# Patient Record
Sex: FEMALE | Race: WHITE | NOT HISPANIC OR LATINO | Employment: FULL TIME | URBAN - NONMETROPOLITAN AREA
[De-identification: names, ages, dates, MRNs, and addresses within clinical notes are randomized per-mention and may not be internally consistent; named-entity substitution may affect disease eponyms.]

---
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
## 2023-09-09 ENCOUNTER — ANESTHESIA (OUTPATIENT)
Dept: SURGERY | Facility: MEDICAL CENTER | Age: 23
DRG: 661 | End: 2023-09-09
Payer: COMMERCIAL

## 2023-09-09 ENCOUNTER — ANESTHESIA EVENT (OUTPATIENT)
Dept: SURGERY | Facility: MEDICAL CENTER | Age: 23
DRG: 661 | End: 2023-09-09
Payer: COMMERCIAL

## 2023-09-09 ENCOUNTER — APPOINTMENT (OUTPATIENT)
Dept: RADIOLOGY | Facility: MEDICAL CENTER | Age: 23
DRG: 661 | End: 2023-09-09
Attending: UROLOGY
Payer: COMMERCIAL

## 2023-09-09 ENCOUNTER — HOSPITAL ENCOUNTER (INPATIENT)
Facility: MEDICAL CENTER | Age: 23
LOS: 1 days | DRG: 661 | End: 2023-09-11
Attending: EMERGENCY MEDICINE | Admitting: INTERNAL MEDICINE
Payer: COMMERCIAL

## 2023-09-09 DIAGNOSIS — N20.1 URETEROLITHIASIS: ICD-10-CM

## 2023-09-09 DIAGNOSIS — N20.0 NEPHROLITHIASIS: ICD-10-CM

## 2023-09-09 PROBLEM — F41.9 ANXIETY: Status: ACTIVE | Noted: 2023-09-09

## 2023-09-09 PROBLEM — N39.0 UTI (URINARY TRACT INFECTION): Status: ACTIVE | Noted: 2023-09-09

## 2023-09-09 PROBLEM — F31.9 BIPOLAR DISORDER (HCC): Status: ACTIVE | Noted: 2023-09-09

## 2023-09-09 LAB
ALBUMIN SERPL BCP-MCNC: 4.6 G/DL (ref 3.2–4.9)
ALBUMIN/GLOB SERPL: 1.7 G/DL
ALP SERPL-CCNC: 59 U/L (ref 30–99)
ALT SERPL-CCNC: 14 U/L (ref 2–50)
ANION GAP SERPL CALC-SCNC: 12 MMOL/L (ref 7–16)
APPEARANCE UR: CLEAR
AST SERPL-CCNC: 17 U/L (ref 12–45)
BACTERIA #/AREA URNS HPF: NEGATIVE /HPF
BASOPHILS # BLD AUTO: 0.5 % (ref 0–1.8)
BASOPHILS # BLD: 0.07 K/UL (ref 0–0.12)
BILIRUB SERPL-MCNC: 0.5 MG/DL (ref 0.1–1.5)
BILIRUB UR QL STRIP.AUTO: NEGATIVE
BUN SERPL-MCNC: 13 MG/DL (ref 8–22)
CALCIUM ALBUM COR SERPL-MCNC: 9.1 MG/DL (ref 8.5–10.5)
CALCIUM SERPL-MCNC: 9.6 MG/DL (ref 8.5–10.5)
CHLORIDE SERPL-SCNC: 105 MMOL/L (ref 96–112)
CO2 SERPL-SCNC: 22 MMOL/L (ref 20–33)
COLOR UR: YELLOW
CREAT SERPL-MCNC: 0.7 MG/DL (ref 0.5–1.4)
EOSINOPHIL # BLD AUTO: 0.46 K/UL (ref 0–0.51)
EOSINOPHIL NFR BLD: 3.6 % (ref 0–6.9)
EPI CELLS #/AREA URNS HPF: ABNORMAL /HPF
ERYTHROCYTE [DISTWIDTH] IN BLOOD BY AUTOMATED COUNT: 43.1 FL (ref 35.9–50)
GFR SERPLBLD CREATININE-BSD FMLA CKD-EPI: 125 ML/MIN/1.73 M 2
GLOBULIN SER CALC-MCNC: 2.7 G/DL (ref 1.9–3.5)
GLUCOSE SERPL-MCNC: 102 MG/DL (ref 65–99)
GLUCOSE UR STRIP.AUTO-MCNC: NEGATIVE MG/DL
HCG SERPL QL: NEGATIVE
HCT VFR BLD AUTO: 40.9 % (ref 37–47)
HGB BLD-MCNC: 14.2 G/DL (ref 12–16)
HYALINE CASTS #/AREA URNS LPF: ABNORMAL /LPF
IMM GRANULOCYTES # BLD AUTO: 0.06 K/UL (ref 0–0.11)
IMM GRANULOCYTES NFR BLD AUTO: 0.5 % (ref 0–0.9)
KETONES UR STRIP.AUTO-MCNC: NEGATIVE MG/DL
LACTATE SERPL-SCNC: 1.2 MMOL/L (ref 0.5–2)
LEUKOCYTE ESTERASE UR QL STRIP.AUTO: ABNORMAL
LIPASE SERPL-CCNC: 22 U/L (ref 11–82)
LYMPHOCYTES # BLD AUTO: 2.67 K/UL (ref 1–4.8)
LYMPHOCYTES NFR BLD: 20.7 % (ref 22–41)
MCH RBC QN AUTO: 32.9 PG (ref 27–33)
MCHC RBC AUTO-ENTMCNC: 34.7 G/DL (ref 32.2–35.5)
MCV RBC AUTO: 94.7 FL (ref 81.4–97.8)
MICRO URNS: ABNORMAL
MONOCYTES # BLD AUTO: 0.88 K/UL (ref 0–0.85)
MONOCYTES NFR BLD AUTO: 6.8 % (ref 0–13.4)
NEUTROPHILS # BLD AUTO: 8.78 K/UL (ref 1.82–7.42)
NEUTROPHILS NFR BLD: 67.9 % (ref 44–72)
NITRITE UR QL STRIP.AUTO: NEGATIVE
NRBC # BLD AUTO: 0 K/UL
NRBC BLD-RTO: 0 /100 WBC (ref 0–0.2)
PH UR STRIP.AUTO: 7.5 [PH] (ref 5–8)
PLATELET # BLD AUTO: 313 K/UL (ref 164–446)
PMV BLD AUTO: 10.9 FL (ref 9–12.9)
POTASSIUM SERPL-SCNC: 4.2 MMOL/L (ref 3.6–5.5)
PROT SERPL-MCNC: 7.3 G/DL (ref 6–8.2)
PROT UR QL STRIP: 30 MG/DL
RBC # BLD AUTO: 4.32 M/UL (ref 4.2–5.4)
RBC # URNS HPF: ABNORMAL /HPF
RBC UR QL AUTO: ABNORMAL
SODIUM SERPL-SCNC: 139 MMOL/L (ref 135–145)
SP GR UR STRIP.AUTO: 1.02
UROBILINOGEN UR STRIP.AUTO-MCNC: 0.2 MG/DL
WBC # BLD AUTO: 12.9 K/UL (ref 4.8–10.8)
WBC #/AREA URNS HPF: ABNORMAL /HPF

## 2023-09-09 PROCEDURE — 160028 HCHG SURGERY MINUTES - 1ST 30 MINS LEVEL 3: Performed by: UROLOGY

## 2023-09-09 PROCEDURE — 81001 URINALYSIS AUTO W/SCOPE: CPT

## 2023-09-09 PROCEDURE — 87086 URINE CULTURE/COLONY COUNT: CPT

## 2023-09-09 PROCEDURE — 36415 COLL VENOUS BLD VENIPUNCTURE: CPT

## 2023-09-09 PROCEDURE — 160036 HCHG PACU - EA ADDL 30 MINS PHASE I: Performed by: UROLOGY

## 2023-09-09 PROCEDURE — C2617 STENT, NON-COR, TEM W/O DEL: HCPCS | Performed by: UROLOGY

## 2023-09-09 PROCEDURE — C1894 INTRO/SHEATH, NON-LASER: HCPCS | Performed by: UROLOGY

## 2023-09-09 PROCEDURE — G0378 HOSPITAL OBSERVATION PER HR: HCPCS

## 2023-09-09 PROCEDURE — 99291 CRITICAL CARE FIRST HOUR: CPT

## 2023-09-09 PROCEDURE — 96374 THER/PROPH/DIAG INJ IV PUSH: CPT

## 2023-09-09 PROCEDURE — 700105 HCHG RX REV CODE 258: Mod: UD

## 2023-09-09 PROCEDURE — 160002 HCHG RECOVERY MINUTES (STAT): Performed by: UROLOGY

## 2023-09-09 PROCEDURE — A9270 NON-COVERED ITEM OR SERVICE: HCPCS | Mod: UD | Performed by: ANESTHESIOLOGY

## 2023-09-09 PROCEDURE — C1747 HCHG SHELL REV 278 C1747: HCPCS | Performed by: UROLOGY

## 2023-09-09 PROCEDURE — A9270 NON-COVERED ITEM OR SERVICE: HCPCS | Mod: UD

## 2023-09-09 PROCEDURE — 700101 HCHG RX REV CODE 250: Mod: UD | Performed by: ANESTHESIOLOGY

## 2023-09-09 PROCEDURE — 160009 HCHG ANES TIME/MIN: Performed by: UROLOGY

## 2023-09-09 PROCEDURE — 84703 CHORIONIC GONADOTROPIN ASSAY: CPT

## 2023-09-09 PROCEDURE — 700102 HCHG RX REV CODE 250 W/ 637 OVERRIDE(OP): Mod: UD

## 2023-09-09 PROCEDURE — 99222 1ST HOSP IP/OBS MODERATE 55: CPT | Mod: GC | Performed by: INTERNAL MEDICINE

## 2023-09-09 PROCEDURE — 700105 HCHG RX REV CODE 258: Mod: UD | Performed by: ANESTHESIOLOGY

## 2023-09-09 PROCEDURE — 96375 TX/PRO/DX INJ NEW DRUG ADDON: CPT | Mod: XU

## 2023-09-09 PROCEDURE — 160035 HCHG PACU - 1ST 60 MINS PHASE I: Performed by: UROLOGY

## 2023-09-09 PROCEDURE — 83605 ASSAY OF LACTIC ACID: CPT

## 2023-09-09 PROCEDURE — 0T768DZ DILATION OF RIGHT URETER WITH INTRALUMINAL DEVICE, VIA NATURAL OR ARTIFICIAL OPENING ENDOSCOPIC: ICD-10-PCS | Performed by: UROLOGY

## 2023-09-09 PROCEDURE — 83690 ASSAY OF LIPASE: CPT

## 2023-09-09 PROCEDURE — 700102 HCHG RX REV CODE 250 W/ 637 OVERRIDE(OP): Mod: UD | Performed by: ANESTHESIOLOGY

## 2023-09-09 PROCEDURE — 700111 HCHG RX REV CODE 636 W/ 250 OVERRIDE (IP): Mod: JZ,UD

## 2023-09-09 PROCEDURE — 80053 COMPREHEN METABOLIC PANEL: CPT

## 2023-09-09 PROCEDURE — 700111 HCHG RX REV CODE 636 W/ 250 OVERRIDE (IP): Mod: UD | Performed by: ANESTHESIOLOGY

## 2023-09-09 PROCEDURE — 700105 HCHG RX REV CODE 258: Performed by: EMERGENCY MEDICINE

## 2023-09-09 PROCEDURE — 0TF38ZZ FRAGMENTATION IN RIGHT KIDNEY PELVIS, VIA NATURAL OR ARTIFICIAL OPENING ENDOSCOPIC: ICD-10-PCS | Performed by: UROLOGY

## 2023-09-09 PROCEDURE — 0TF68ZZ FRAGMENTATION IN RIGHT URETER, VIA NATURAL OR ARTIFICIAL OPENING ENDOSCOPIC: ICD-10-PCS | Performed by: UROLOGY

## 2023-09-09 PROCEDURE — 160039 HCHG SURGERY MINUTES - EA ADDL 1 MIN LEVEL 3: Performed by: UROLOGY

## 2023-09-09 PROCEDURE — 85025 COMPLETE CBC W/AUTO DIFF WBC: CPT

## 2023-09-09 PROCEDURE — C1769 GUIDE WIRE: HCPCS | Performed by: UROLOGY

## 2023-09-09 PROCEDURE — 700111 HCHG RX REV CODE 636 W/ 250 OVERRIDE (IP): Mod: JZ,UD | Performed by: EMERGENCY MEDICINE

## 2023-09-09 PROCEDURE — 96376 TX/PRO/DX INJ SAME DRUG ADON: CPT | Mod: XU

## 2023-09-09 PROCEDURE — 160048 HCHG OR STATISTICAL LEVEL 1-5: Performed by: UROLOGY

## 2023-09-09 DEVICE — STENT UROLOGICAL POLARIS 6X24  ULTRA: Type: IMPLANTABLE DEVICE | Site: URETER | Status: FUNCTIONAL

## 2023-09-09 RX ORDER — CLONAZEPAM 0.5 MG/1
0.5 TABLET ORAL 2 TIMES DAILY PRN
Status: DISCONTINUED | OUTPATIENT
Start: 2023-09-10 | End: 2023-09-11 | Stop reason: HOSPADM

## 2023-09-09 RX ORDER — OXYCODONE HCL 5 MG/5 ML
10 SOLUTION, ORAL ORAL
Status: DISCONTINUED | OUTPATIENT
Start: 2023-09-09 | End: 2023-09-09

## 2023-09-09 RX ORDER — AMOXICILLIN 250 MG
2 CAPSULE ORAL 2 TIMES DAILY
Status: DISCONTINUED | OUTPATIENT
Start: 2023-09-09 | End: 2023-09-11 | Stop reason: HOSPADM

## 2023-09-09 RX ORDER — HALOPERIDOL 5 MG/ML
1 INJECTION INTRAMUSCULAR
Status: DISCONTINUED | OUTPATIENT
Start: 2023-09-09 | End: 2023-09-09 | Stop reason: HOSPADM

## 2023-09-09 RX ORDER — LIDOCAINE HYDROCHLORIDE 20 MG/ML
INJECTION, SOLUTION EPIDURAL; INFILTRATION; INTRACAUDAL; PERINEURAL PRN
Status: DISCONTINUED | OUTPATIENT
Start: 2023-09-09 | End: 2023-09-09 | Stop reason: SURG

## 2023-09-09 RX ORDER — DIPHENHYDRAMINE HYDROCHLORIDE 50 MG/ML
12.5 INJECTION INTRAMUSCULAR; INTRAVENOUS
Status: DISCONTINUED | OUTPATIENT
Start: 2023-09-09 | End: 2023-09-09 | Stop reason: HOSPADM

## 2023-09-09 RX ORDER — BISACODYL 10 MG
10 SUPPOSITORY, RECTAL RECTAL
Status: DISCONTINUED | OUTPATIENT
Start: 2023-09-09 | End: 2023-09-11

## 2023-09-09 RX ORDER — SODIUM CHLORIDE 9 MG/ML
30 INJECTION, SOLUTION INTRAVENOUS ONCE
Status: COMPLETED | OUTPATIENT
Start: 2023-09-09 | End: 2023-09-09

## 2023-09-09 RX ORDER — DEXAMETHASONE SODIUM PHOSPHATE 4 MG/ML
INJECTION, SOLUTION INTRA-ARTICULAR; INTRALESIONAL; INTRAMUSCULAR; INTRAVENOUS; SOFT TISSUE PRN
Status: DISCONTINUED | OUTPATIENT
Start: 2023-09-09 | End: 2023-09-09 | Stop reason: SURG

## 2023-09-09 RX ORDER — OXYCODONE HYDROCHLORIDE 5 MG/1
2.5 TABLET ORAL
Status: DISCONTINUED | OUTPATIENT
Start: 2023-09-09 | End: 2023-09-09

## 2023-09-09 RX ORDER — HYDROMORPHONE HYDROCHLORIDE 1 MG/ML
0.5 INJECTION, SOLUTION INTRAMUSCULAR; INTRAVENOUS; SUBCUTANEOUS
Status: DISCONTINUED | OUTPATIENT
Start: 2023-09-09 | End: 2023-09-09 | Stop reason: HOSPADM

## 2023-09-09 RX ORDER — HYDROMORPHONE HYDROCHLORIDE 1 MG/ML
0.4 INJECTION, SOLUTION INTRAMUSCULAR; INTRAVENOUS; SUBCUTANEOUS
Status: DISCONTINUED | OUTPATIENT
Start: 2023-09-09 | End: 2023-09-09

## 2023-09-09 RX ORDER — ACETAMINOPHEN 500 MG
1000 TABLET ORAL EVERY 6 HOURS
Status: DISCONTINUED | OUTPATIENT
Start: 2023-09-09 | End: 2023-09-11 | Stop reason: HOSPADM

## 2023-09-09 RX ORDER — POLYETHYLENE GLYCOL 3350 17 G/17G
1 POWDER, FOR SOLUTION ORAL
Status: DISCONTINUED | OUTPATIENT
Start: 2023-09-09 | End: 2023-09-11 | Stop reason: HOSPADM

## 2023-09-09 RX ORDER — GLYCOPYRROLATE 0.2 MG/ML
INJECTION INTRAMUSCULAR; INTRAVENOUS PRN
Status: DISCONTINUED | OUTPATIENT
Start: 2023-09-09 | End: 2023-09-09 | Stop reason: SURG

## 2023-09-09 RX ORDER — HYDROCODONE BITARTRATE AND ACETAMINOPHEN 5; 325 MG/1; MG/1
1 TABLET ORAL ONCE
Status: COMPLETED | OUTPATIENT
Start: 2023-09-09 | End: 2023-09-09

## 2023-09-09 RX ORDER — OXYCODONE HYDROCHLORIDE 5 MG/1
5 TABLET ORAL
Status: DISCONTINUED | OUTPATIENT
Start: 2023-09-09 | End: 2023-09-09

## 2023-09-09 RX ORDER — HYDROMORPHONE HYDROCHLORIDE 1 MG/ML
0.2 INJECTION, SOLUTION INTRAMUSCULAR; INTRAVENOUS; SUBCUTANEOUS
Status: DISCONTINUED | OUTPATIENT
Start: 2023-09-09 | End: 2023-09-09

## 2023-09-09 RX ORDER — LORAZEPAM 2 MG/ML
1 INJECTION INTRAMUSCULAR
Status: COMPLETED | OUTPATIENT
Start: 2023-09-09 | End: 2023-09-09

## 2023-09-09 RX ORDER — DULOXETIN HYDROCHLORIDE 30 MG/1
30 CAPSULE, DELAYED RELEASE ORAL EVERY EVENING
Status: DISCONTINUED | OUTPATIENT
Start: 2023-09-09 | End: 2023-09-11 | Stop reason: HOSPADM

## 2023-09-09 RX ORDER — HYDROCODONE BITARTRATE AND ACETAMINOPHEN 5; 325 MG/1; MG/1
1 TABLET ORAL EVERY 8 HOURS PRN
Status: DISCONTINUED | OUTPATIENT
Start: 2023-09-09 | End: 2023-09-09

## 2023-09-09 RX ORDER — OXYCODONE HCL 5 MG/5 ML
5 SOLUTION, ORAL ORAL
Status: DISCONTINUED | OUTPATIENT
Start: 2023-09-09 | End: 2023-09-09

## 2023-09-09 RX ORDER — ACETAMINOPHEN 500 MG
1000 TABLET ORAL EVERY 6 HOURS PRN
Status: DISCONTINUED | OUTPATIENT
Start: 2023-09-14 | End: 2023-09-11 | Stop reason: HOSPADM

## 2023-09-09 RX ORDER — MEPERIDINE HYDROCHLORIDE 25 MG/ML
12.5 INJECTION INTRAMUSCULAR; INTRAVENOUS; SUBCUTANEOUS
Status: DISCONTINUED | OUTPATIENT
Start: 2023-09-09 | End: 2023-09-09 | Stop reason: HOSPADM

## 2023-09-09 RX ORDER — LEVOFLOXACIN 500 MG/1
250 TABLET, FILM COATED ORAL DAILY
Status: ON HOLD | COMMUNITY
End: 2023-09-11

## 2023-09-09 RX ORDER — CLONAZEPAM 0.5 MG/1
0.5 TABLET ORAL 2 TIMES DAILY PRN
COMMUNITY

## 2023-09-09 RX ORDER — HYDROCODONE BITARTRATE AND ACETAMINOPHEN 5; 325 MG/1; MG/1
1 TABLET ORAL EVERY 8 HOURS PRN
Status: DISCONTINUED | OUTPATIENT
Start: 2023-09-09 | End: 2023-09-10

## 2023-09-09 RX ORDER — HYDRALAZINE HYDROCHLORIDE 20 MG/ML
5 INJECTION INTRAMUSCULAR; INTRAVENOUS
Status: DISCONTINUED | OUTPATIENT
Start: 2023-09-09 | End: 2023-09-09 | Stop reason: HOSPADM

## 2023-09-09 RX ORDER — ARIPIPRAZOLE 5 MG/1
5 TABLET ORAL DAILY
COMMUNITY

## 2023-09-09 RX ORDER — SODIUM CHLORIDE, SODIUM LACTATE, POTASSIUM CHLORIDE, CALCIUM CHLORIDE 600; 310; 30; 20 MG/100ML; MG/100ML; MG/100ML; MG/100ML
INJECTION, SOLUTION INTRAVENOUS CONTINUOUS
Status: DISCONTINUED | OUTPATIENT
Start: 2023-09-09 | End: 2023-09-11 | Stop reason: HOSPADM

## 2023-09-09 RX ORDER — TAMSULOSIN HYDROCHLORIDE 0.4 MG/1
0.4 CAPSULE ORAL
Status: DISCONTINUED | OUTPATIENT
Start: 2023-09-09 | End: 2023-09-11 | Stop reason: HOSPADM

## 2023-09-09 RX ORDER — ONDANSETRON 2 MG/ML
INJECTION INTRAMUSCULAR; INTRAVENOUS PRN
Status: DISCONTINUED | OUTPATIENT
Start: 2023-09-09 | End: 2023-09-09 | Stop reason: SURG

## 2023-09-09 RX ORDER — MORPHINE SULFATE 4 MG/ML
1 INJECTION INTRAVENOUS
Status: DISCONTINUED | OUTPATIENT
Start: 2023-09-09 | End: 2023-09-10

## 2023-09-09 RX ORDER — SODIUM CHLORIDE, SODIUM LACTATE, POTASSIUM CHLORIDE, CALCIUM CHLORIDE 600; 310; 30; 20 MG/100ML; MG/100ML; MG/100ML; MG/100ML
INJECTION, SOLUTION INTRAVENOUS
Status: DISCONTINUED | OUTPATIENT
Start: 2023-09-09 | End: 2023-09-09 | Stop reason: SURG

## 2023-09-09 RX ORDER — ARIPIPRAZOLE 10 MG/1
5 TABLET ORAL DAILY
Status: DISCONTINUED | OUTPATIENT
Start: 2023-09-10 | End: 2023-09-11 | Stop reason: HOSPADM

## 2023-09-09 RX ORDER — EPHEDRINE SULFATE 50 MG/ML
5 INJECTION, SOLUTION INTRAVENOUS
Status: DISCONTINUED | OUTPATIENT
Start: 2023-09-09 | End: 2023-09-09 | Stop reason: HOSPADM

## 2023-09-09 RX ORDER — MORPHINE SULFATE 4 MG/ML
4 INJECTION INTRAVENOUS ONCE
Status: COMPLETED | OUTPATIENT
Start: 2023-09-09 | End: 2023-09-09

## 2023-09-09 RX ORDER — CEFTRIAXONE 2 G/1
2000 INJECTION, POWDER, FOR SOLUTION INTRAMUSCULAR; INTRAVENOUS ONCE
Status: COMPLETED | OUTPATIENT
Start: 2023-09-09 | End: 2023-09-09

## 2023-09-09 RX ORDER — CLONAZEPAM 0.5 MG/1
0.25 TABLET ORAL
Status: COMPLETED | OUTPATIENT
Start: 2023-09-09 | End: 2023-09-09

## 2023-09-09 RX ORDER — SODIUM CHLORIDE, SODIUM LACTATE, POTASSIUM CHLORIDE, CALCIUM CHLORIDE 600; 310; 30; 20 MG/100ML; MG/100ML; MG/100ML; MG/100ML
INJECTION, SOLUTION INTRAVENOUS CONTINUOUS
Status: DISCONTINUED | OUTPATIENT
Start: 2023-09-09 | End: 2023-09-09 | Stop reason: HOSPADM

## 2023-09-09 RX ORDER — DULOXETIN HYDROCHLORIDE 30 MG/1
30 CAPSULE, DELAYED RELEASE ORAL DAILY
COMMUNITY

## 2023-09-09 RX ORDER — CIPROFLOXACIN 2 MG/ML
INJECTION, SOLUTION INTRAVENOUS PRN
Status: DISCONTINUED | OUTPATIENT
Start: 2023-09-09 | End: 2023-09-09 | Stop reason: SURG

## 2023-09-09 RX ORDER — MORPHINE SULFATE 4 MG/ML
2 INJECTION INTRAVENOUS
Status: DISCONTINUED | OUTPATIENT
Start: 2023-09-09 | End: 2023-09-09

## 2023-09-09 RX ORDER — MIDAZOLAM HYDROCHLORIDE 1 MG/ML
INJECTION INTRAMUSCULAR; INTRAVENOUS PRN
Status: DISCONTINUED | OUTPATIENT
Start: 2023-09-09 | End: 2023-09-09 | Stop reason: SDUPTHER

## 2023-09-09 RX ORDER — ONDANSETRON 2 MG/ML
4 INJECTION INTRAMUSCULAR; INTRAVENOUS ONCE
Status: COMPLETED | OUTPATIENT
Start: 2023-09-09 | End: 2023-09-09

## 2023-09-09 RX ORDER — KETOROLAC TROMETHAMINE 30 MG/ML
INJECTION, SOLUTION INTRAMUSCULAR; INTRAVENOUS PRN
Status: DISCONTINUED | OUTPATIENT
Start: 2023-09-09 | End: 2023-09-09 | Stop reason: SURG

## 2023-09-09 RX ORDER — LORAZEPAM 2 MG/ML
0.5 INJECTION INTRAMUSCULAR ONCE
Status: COMPLETED | OUTPATIENT
Start: 2023-09-09 | End: 2023-09-09

## 2023-09-09 RX ADMIN — HYDROCODONE BITARTRATE AND ACETAMINOPHEN 1 TABLET: 5; 325 TABLET ORAL at 21:55

## 2023-09-09 RX ADMIN — HYDROMORPHONE HYDROCHLORIDE 0.4 MG: 1 INJECTION, SOLUTION INTRAMUSCULAR; INTRAVENOUS; SUBCUTANEOUS at 17:15

## 2023-09-09 RX ADMIN — MORPHINE SULFATE 4 MG: 4 INJECTION, SOLUTION INTRAMUSCULAR; INTRAVENOUS at 12:26

## 2023-09-09 RX ADMIN — DULOXETINE HYDROCHLORIDE 30 MG: 30 CAPSULE, DELAYED RELEASE ORAL at 21:55

## 2023-09-09 RX ADMIN — HYDROMORPHONE HYDROCHLORIDE 0.4 MG: 1 INJECTION, SOLUTION INTRAMUSCULAR; INTRAVENOUS; SUBCUTANEOUS at 18:32

## 2023-09-09 RX ADMIN — ONDANSETRON 4 MG: 2 INJECTION INTRAMUSCULAR; INTRAVENOUS at 12:26

## 2023-09-09 RX ADMIN — LIDOCAINE HYDROCHLORIDE 60 MG: 20 INJECTION, SOLUTION EPIDURAL; INFILTRATION; INTRACAUDAL at 15:12

## 2023-09-09 RX ADMIN — CIPROFLOXACIN 400 MG: 400 INJECTION, SOLUTION INTRAVENOUS at 15:15

## 2023-09-09 RX ADMIN — CEFTRIAXONE SODIUM 2000 MG: 2 INJECTION, POWDER, FOR SOLUTION INTRAMUSCULAR; INTRAVENOUS at 13:26

## 2023-09-09 RX ADMIN — HYDROMORPHONE HYDROCHLORIDE 0.4 MG: 1 INJECTION, SOLUTION INTRAMUSCULAR; INTRAVENOUS; SUBCUTANEOUS at 17:30

## 2023-09-09 RX ADMIN — CLONAZEPAM 0.25 MG: 0.5 TABLET ORAL at 21:55

## 2023-09-09 RX ADMIN — HYDROCODONE BITARTRATE AND ACETAMINOPHEN 1 TABLET: 5; 325 TABLET ORAL at 17:15

## 2023-09-09 RX ADMIN — MORPHINE SULFATE 1 MG: 4 INJECTION, SOLUTION INTRAMUSCULAR; INTRAVENOUS at 23:21

## 2023-09-09 RX ADMIN — LORAZEPAM 1 MG: 2 INJECTION INTRAMUSCULAR; INTRAVENOUS at 16:35

## 2023-09-09 RX ADMIN — KETAMINE HYDROCHLORIDE 50 MG: 100 INJECTION INTRAMUSCULAR; INTRAVENOUS at 15:12

## 2023-09-09 RX ADMIN — SODIUM CHLORIDE, POTASSIUM CHLORIDE, SODIUM LACTATE AND CALCIUM CHLORIDE: 600; 310; 30; 20 INJECTION, SOLUTION INTRAVENOUS at 15:08

## 2023-09-09 RX ADMIN — HYDROMORPHONE HYDROCHLORIDE 0.2 MG: 1 INJECTION, SOLUTION INTRAMUSCULAR; INTRAVENOUS; SUBCUTANEOUS at 17:44

## 2023-09-09 RX ADMIN — KETOROLAC TROMETHAMINE 30 MG: 30 INJECTION, SOLUTION INTRAMUSCULAR; INTRAVENOUS at 15:53

## 2023-09-09 RX ADMIN — MEPERIDINE HYDROCHLORIDE 12.5 MG: 25 INJECTION INTRAMUSCULAR; INTRAVENOUS; SUBCUTANEOUS at 16:14

## 2023-09-09 RX ADMIN — DEXAMETHASONE SODIUM PHOSPHATE 8 MG: 4 INJECTION INTRA-ARTICULAR; INTRALESIONAL; INTRAMUSCULAR; INTRAVENOUS; SOFT TISSUE at 15:15

## 2023-09-09 RX ADMIN — LORAZEPAM 1 MG: 2 INJECTION INTRAMUSCULAR; INTRAVENOUS at 18:03

## 2023-09-09 RX ADMIN — PROPOFOL 150 MG: 10 INJECTION, EMULSION INTRAVENOUS at 15:12

## 2023-09-09 RX ADMIN — GLYCOPYRROLATE 0.2 MG: 0.2 INJECTION INTRAMUSCULAR; INTRAVENOUS at 15:42

## 2023-09-09 RX ADMIN — FENTANYL CITRATE 50 MCG: 50 INJECTION, SOLUTION INTRAMUSCULAR; INTRAVENOUS at 15:12

## 2023-09-09 RX ADMIN — SODIUM CHLORIDE 2604 ML: 9 INJECTION, SOLUTION INTRAVENOUS at 12:23

## 2023-09-09 RX ADMIN — MIDAZOLAM 2 MG: 1 INJECTION, SOLUTION INTRAMUSCULAR; INTRAVENOUS at 15:08

## 2023-09-09 RX ADMIN — ROCURONIUM BROMIDE 50 MG: 10 INJECTION, SOLUTION INTRAVENOUS at 15:12

## 2023-09-09 RX ADMIN — HYDROMORPHONE HYDROCHLORIDE 0.4 MG: 1 INJECTION, SOLUTION INTRAMUSCULAR; INTRAVENOUS; SUBCUTANEOUS at 18:03

## 2023-09-09 RX ADMIN — SUGAMMADEX 200 MG: 100 INJECTION, SOLUTION INTRAVENOUS at 15:53

## 2023-09-09 RX ADMIN — DIPHENHYDRAMINE HYDROCHLORIDE 12.5 MG: 50 INJECTION, SOLUTION INTRAMUSCULAR; INTRAVENOUS at 19:02

## 2023-09-09 RX ADMIN — ONDANSETRON 4 MG: 2 INJECTION INTRAMUSCULAR; INTRAVENOUS at 15:53

## 2023-09-09 RX ADMIN — LORAZEPAM 0.5 MG: 2 INJECTION INTRAMUSCULAR; INTRAVENOUS at 13:37

## 2023-09-09 ASSESSMENT — LIFESTYLE VARIABLES
EVER HAD A DRINK FIRST THING IN THE MORNING TO STEADY YOUR NERVES TO GET RID OF A HANGOVER: NO
ALCOHOL_USE: NO
ON A TYPICAL DAY WHEN YOU DRINK ALCOHOL HOW MANY DRINKS DO YOU HAVE: 0
DOES PATIENT WANT TO STOP DRINKING: NO
TOTAL SCORE: 0
HAVE YOU EVER FELT YOU SHOULD CUT DOWN ON YOUR DRINKING: NO
AVERAGE NUMBER OF DAYS PER WEEK YOU HAVE A DRINK CONTAINING ALCOHOL: 0
HAVE PEOPLE ANNOYED YOU BY CRITICIZING YOUR DRINKING: NO
TOTAL SCORE: 0
TOTAL SCORE: 0
CONSUMPTION TOTAL: NEGATIVE
EVER FELT BAD OR GUILTY ABOUT YOUR DRINKING: NO
HOW MANY TIMES IN THE PAST YEAR HAVE YOU HAD 5 OR MORE DRINKS IN A DAY: 0

## 2023-09-09 ASSESSMENT — COGNITIVE AND FUNCTIONAL STATUS - GENERAL
MOBILITY SCORE: 24
DAILY ACTIVITIY SCORE: 24
SUGGESTED CMS G CODE MODIFIER MOBILITY: CH
SUGGESTED CMS G CODE MODIFIER DAILY ACTIVITY: CH

## 2023-09-09 ASSESSMENT — PAIN DESCRIPTION - PAIN TYPE
TYPE: ACUTE PAIN;SURGICAL PAIN
TYPE: ACUTE PAIN
TYPE: ACUTE PAIN;SURGICAL PAIN
TYPE: ACUTE PAIN
TYPE: ACUTE PAIN
TYPE: ACUTE PAIN;SURGICAL PAIN
TYPE: ACUTE PAIN
TYPE: ACUTE PAIN
TYPE: ACUTE PAIN;SURGICAL PAIN

## 2023-09-09 ASSESSMENT — ENCOUNTER SYMPTOMS
RESPIRATORY NEGATIVE: 1
MUSCULOSKELETAL NEGATIVE: 1
EYES NEGATIVE: 1
NEUROLOGICAL NEGATIVE: 1
VOMITING: 1
CARDIOVASCULAR NEGATIVE: 1
DIAPHORESIS: 0
PSYCHIATRIC NEGATIVE: 1
NAUSEA: 1
HEARTBURN: 0

## 2023-09-09 ASSESSMENT — FIBROSIS 4 INDEX: FIB4 SCORE: 0.32

## 2023-09-09 ASSESSMENT — PATIENT HEALTH QUESTIONNAIRE - PHQ9
2. FEELING DOWN, DEPRESSED, IRRITABLE, OR HOPELESS: NOT AT ALL
SUM OF ALL RESPONSES TO PHQ9 QUESTIONS 1 AND 2: 0
1. LITTLE INTEREST OR PLEASURE IN DOING THINGS: NOT AT ALL

## 2023-09-09 ASSESSMENT — PAIN SCALES - GENERAL: PAIN_LEVEL: 6

## 2023-09-09 NOTE — CONSULTS
UROLOGY CONSULT (dictated)    Requested by:  dr rogers  Reason:  right kidney stone, colic, UTI    Recommendations:  confirmed enterococcus, she HAS been on 3 days of levaquin and is without signs/symptoms of sepsis.  To OR for right ureteroscopic laser lithotripsy, with ureteral stent.  I have explained to the patient the indications for this procedure, and we have discussed the customary risks, which include but are not limited to, bleeding, infection, damage to local structures.  The patient expresses adequate understanding and gives informed consent.

## 2023-09-09 NOTE — ED TRIAGE NOTES
"Chief Complaint   Patient presents with    Flank Pain     Pt was diagnosed with a right sided kidney stone yesterday in the ER in [place]. Pt was instructed to come here for a lithotripsy. Pt tearful and in pain. Pt denies taking any medication PTA.      Pt ambulatory from lobby with steady gait.  Abdominal pain protocol ordered.     Pt is alert and oriented, speaking in full sentences, follows commands and responds appropriately to questions. Resp are even and unlabored.      Pt placed in lobby. Pt educated on triage process. Pt encouraged to alert staff for any changes.     Patient and staff wearing appropriate PPE.    /71   Pulse 62   Temp 36.3 °C (97.4 °F) (Temporal)   Resp 20   Ht 1.727 m (5' 8\")   Wt 86.8 kg (191 lb 5.8 oz)   SpO2 99%      "

## 2023-09-09 NOTE — ED PROVIDER NOTES
ED Provider Note    CHIEF COMPLAINT  Chief Complaint   Patient presents with    Flank Pain     Pt was diagnosed with a right sided kidney stone yesterday in the ER in [place]. Pt was instructed to come here for a lithotripsy. Pt tearful and in pain. Pt denies taking any medication PTA.        EXTERNAL RECORDS REVIEWED  External ED Note -patient seen at outside facility/[facility] 3 times over the past week for right flank pain.  She had 3 separate CTs confirming a 6 x 5 x 6-1/2 mm ureteral stone at the [facility].  She was prescribed antibiotics but did not pick these up.  She was given Levaquin each time she was seen in the ER.  Her urine was grossly infected and a culture grew out E faecalis.  She was prescribed Flomax and Norco 10-3 25 as well.    HPI/ROS  LIMITATION TO HISTORY   Select: : None  OUTSIDE HISTORIAN(S):  None    Adarsh Weber is a 22 y.o. female with a history of multiple and frequent kidney stones who presents with a chief complaint of right flank pain in the setting of known kidney stone and urinary tract infection.  Patient notes that she passes kidney stones frequently, has had multiple lithotripsy procedures in the past.  It is hard for her to determine when she first developed the pain in her right flank because she frequently passes stones, most recently 2 weeks ago, but yesterday it became severe and was associated with dysuria, nausea, vomiting, and loose stool.  She went to an outside hospital where she was diagnosed with 2 right-sided kidney stones and a urinary tract infection.  She was prescribed antibiotics and told to come to the [facility] ER so that she could see urology.  She drove from [place] to [place] yesterday but did not feel well enough to come to the ER and has not yet picked up her antibiotics.  She denies any fevers.    PAST MEDICAL HISTORY   has a past medical history of ADHD (attention deficit hyperactivity disorder), Cellulitis, GERD (gastroesophageal reflux disease),  "Hypogammaglobulinemia (HCC), Pneumonia, and RSV bronchiolitis.    SURGICAL HISTORY   has a past surgical history that includes fundoplication baby and tonsillectomy and adenoidectomy.    FAMILY HISTORY  Family History   Problem Relation Age of Onset    Hyperlipidemia Mother     Other Mother         fibromyalgia, migraines, essential tremors    Thyroid Maternal Aunt         hoshimotos    Hyperlipidemia Maternal Grandmother     Heart Disease Maternal Grandmother     Diabetes Maternal Grandmother     Hypertension Maternal Grandmother     Other Maternal Grandmother         gout    Stroke Maternal Grandmother     Hyperlipidemia Maternal Grandfather     Diabetes Maternal Grandfather     Hypertension Maternal Grandfather     Cancer Maternal Grandfather        SOCIAL HISTORY  Social History     Tobacco Use    Smoking status: Every Day     Current packs/day: 1.00     Types: Cigarettes    Smokeless tobacco: Never    Tobacco comments:     vape on days she doesnt smoke   Substance and Sexual Activity    Alcohol use: No    Drug use: No     Types: Marijuana    Sexual activity: Yes     Partners: Male     Birth control/protection: Injection       CURRENT MEDICATIONS  Home Medications       Reviewed by Nicol Willard R.N. (Registered Nurse) on 09/09/23 at 1042  Med List Status: Not Addressed     Medication Last Dose Status   hydrOXYzine HCl (ATARAX) 25 MG Tab  Active   omeprazole (PRILOSEC) 20 MG delayed-release capsule  Active   ziprasidone (GEODON) 20 MG Cap  Active                    ALLERGIES  Allergies   Allergen Reactions    Lamictal [Aspartame-Lamotrigine]      Rash, itching and trouble breathing    Sumatriptan Hives     Chest pain and hives       PHYSICAL EXAM  VITAL SIGNS: /71   Pulse 62   Temp 36.3 °C (97.4 °F) (Temporal)   Resp 20   Ht 1.727 m (5' 8\")   Wt 86.8 kg (191 lb 5.8 oz)   SpO2 99%   BMI 29.10 kg/m²    Physical Exam  Vitals and nursing note reviewed.   Constitutional:       Appearance: Normal " appearance.   HENT:      Head: Normocephalic and atraumatic.      Right Ear: External ear normal.      Left Ear: External ear normal.      Nose: Nose normal.      Mouth/Throat:      Mouth: Mucous membranes are dry.   Eyes:      Extraocular Movements: Extraocular movements intact.      Conjunctiva/sclera: Conjunctivae normal.      Pupils: Pupils are equal, round, and reactive to light.   Cardiovascular:      Rate and Rhythm: Normal rate and regular rhythm.   Pulmonary:      Effort: Pulmonary effort is normal.      Breath sounds: Normal breath sounds.   Abdominal:      Palpations: Abdomen is soft.      Tenderness: There is right CVA tenderness. There is no left CVA tenderness or guarding.      Comments: Right-sided tenderness without peritoneal signs.   Musculoskeletal:         General: Normal range of motion.      Cervical back: Normal range of motion and neck supple.   Skin:     General: Skin is warm and dry.   Neurological:      General: No focal deficit present.      Mental Status: She is alert and oriented to person, place, and time.   Psychiatric:         Mood and Affect: Mood normal.         Behavior: Behavior normal.       DIAGNOSTIC STUDIES / PROCEDURES  LABS  Results for orders placed or performed during the hospital encounter of 09/09/23   CBC WITH DIFFERENTIAL   Result Value Ref Range    WBC 12.9 (H) 4.8 - 10.8 K/uL    RBC 4.32 4.20 - 5.40 M/uL    Hemoglobin 14.2 12.0 - 16.0 g/dL    Hematocrit 40.9 37.0 - 47.0 %    MCV 94.7 81.4 - 97.8 fL    MCH 32.9 27.0 - 33.0 pg    MCHC 34.7 32.2 - 35.5 g/dL    RDW 43.1 35.9 - 50.0 fL    Platelet Count 313 164 - 446 K/uL    MPV 10.9 9.0 - 12.9 fL    Neutrophils-Polys 67.90 44.00 - 72.00 %    Lymphocytes 20.70 (L) 22.00 - 41.00 %    Monocytes 6.80 0.00 - 13.40 %    Eosinophils 3.60 0.00 - 6.90 %    Basophils 0.50 0.00 - 1.80 %    Immature Granulocytes 0.50 0.00 - 0.90 %    Nucleated RBC 0.00 0.00 - 0.20 /100 WBC    Neutrophils (Absolute) 8.78 (H) 1.82 - 7.42 K/uL     Lymphs (Absolute) 2.67 1.00 - 4.80 K/uL    Monos (Absolute) 0.88 (H) 0.00 - 0.85 K/uL    Eos (Absolute) 0.46 0.00 - 0.51 K/uL    Baso (Absolute) 0.07 0.00 - 0.12 K/uL    Immature Granulocytes (abs) 0.06 0.00 - 0.11 K/uL    NRBC (Absolute) 0.00 K/uL   COMP METABOLIC PANEL   Result Value Ref Range    Sodium 139 135 - 145 mmol/L    Potassium 4.2 3.6 - 5.5 mmol/L    Chloride 105 96 - 112 mmol/L    Co2 22 20 - 33 mmol/L    Anion Gap 12.0 7.0 - 16.0    Glucose 102 (H) 65 - 99 mg/dL    Bun 13 8 - 22 mg/dL    Creatinine 0.70 0.50 - 1.40 mg/dL    Calcium 9.6 8.5 - 10.5 mg/dL    Correct Calcium 9.1 8.5 - 10.5 mg/dL    AST(SGOT) 17 12 - 45 U/L    ALT(SGPT) 14 2 - 50 U/L    Alkaline Phosphatase 59 30 - 99 U/L    Total Bilirubin 0.5 0.1 - 1.5 mg/dL    Albumin 4.6 3.2 - 4.9 g/dL    Total Protein 7.3 6.0 - 8.2 g/dL    Globulin 2.7 1.9 - 3.5 g/dL    A-G Ratio 1.7 g/dL   LIPASE   Result Value Ref Range    Lipase 22 11 - 82 U/L   HCG QUAL SERUM   Result Value Ref Range    Beta-Hcg Qualitative Serum Negative Negative   URINALYSIS,CULTURE IF INDICATED    Specimen: Urine   Result Value Ref Range    Color Yellow     Character Clear     Specific Gravity 1.016 <1.035    Ph 7.5 5.0 - 8.0    Glucose Negative Negative mg/dL    Ketones Negative Negative mg/dL    Protein 30 (A) Negative mg/dL    Bilirubin Negative Negative    Urobilinogen, Urine 0.2 Negative    Nitrite Negative Negative    Leukocyte Esterase Trace (A) Negative    Occult Blood Moderate (A) Negative    Micro Urine Req Microscopic    Lactic Acid   Result Value Ref Range    Lactic Acid 1.2 0.5 - 2.0 mmol/L   URINE MICROSCOPIC (W/UA)   Result Value Ref Range    WBC 10-20 (A) /hpf    -150 (A) /hpf    Bacteria Negative None /hpf    Epithelial Cells Few /hpf    Hyaline Cast 0-2 /lpf   ESTIMATED GFR   Result Value Ref Range    GFR (CKD-EPI) 125 >60 mL/min/1.73 m 2     RADIOLOGY  Radiologist interpretation:   DX-CYSTO FLUORO > 1 HOUR    (Results Pending)     COURSE & MEDICAL  DECISION MAKING    ED Observation Status? No; Patient does not meet criteria for ED Observation.     INITIAL ASSESSMENT, COURSE AND PLAN  Care Narrative: This is a 22-year-old female with a history of frequent kidney stones who is here with known 6 x 5 x 6-1/2 mm right ureteral stone that she has been unable to pass despite Flomax and oral pain medications.  Per notes from outside facility, her urine was infected and grew out greater than 100,000 CFU/mL E faecalis confirming UTI.  She has not picked up her antibiotics yet.  Here she is afebrile with normal vital signs, appears slightly dehydrated with dry mucous membranes but nontoxic.  Tenderness in the right abdomen without peritoneal signs and right CVA tenderness.    Patient was started on IV fluids, ceftriaxone, and pain medication.  Repeat CT scan is not indicated.    CBC reveals leukocytosis with elevation in absolute neutrophils.  Metabolic panel is reassuring.  She has normal renal function.  UA with white cells and leukocyte esterase but no bacteria or nitrites.  hCG is negative.    I spoke with our on-call urologist, Dr. Castelan, who recommends hospitalization for stent placement.  Patient was subsequently discussed with the hospitalist and admitted in guarded condition.    HYDRATION: Based on the patient's presentation of Dehydration and Other ureterolithiasis the patient was given IV fluids. IV Hydration was used because oral hydration was not adequate alone. Upon recheck following hydration, the patient was unchanged.    ADDITIONAL PROBLEM LIST  None  DISPOSITION AND DISCUSSIONS  I have discussed management of the patient with the following physicians and KATIA's: Dr. Castelan, urology.  Dr. Isaacs, hospitalist.    Discussion of management with other Hospitals in [place] or appropriate source(s): None     Escalation of care considered, and ultimately not performed: N/A.    Barriers to care at this time, including but not limited to: Patient does not have established PCP.      Decision tools and prescription drugs considered including, but not limited to: N/A.    FINAL DIAGNOSIS  1.  Infected right ureteral stone     Electronically signed by: Ricky Feliz M.D., 9/9/2023 11:40 AM

## 2023-09-09 NOTE — ED NOTES
Bedside report received from Janet LUCERO, pt laying in bed, no signs of distress noted at this time, will continue to monitor

## 2023-09-09 NOTE — ASSESSMENT & PLAN NOTE
-S/P right cystoscopy with ureteral stent insertion and lithotripsy with urology 09/09/23  - concern for possible struvite stone given location of ureterolithiasis, however urine pH 7.5  -Appropriate hydration  -Appropriate pain management as below   -Urine assessment for renal calculi assessment/studies pending given history of recurrent stone  -Tamsulosin 0.4 mg daily

## 2023-09-09 NOTE — ANESTHESIA PREPROCEDURE EVALUATION
Case: [id number] Date/Time: 09/09/23 1400    Procedures:       CYSTOSCOPY, WITH URETERAL STENT INSERTION OR REMOVAL (Right: Ureter)      LITHOTRIPSY, USING LASER (Ureter)    Anesthesia type: General    Pre-op diagnosis: Right Ureteral Stone    Location: [facility] OR 18 / SURGERY [facility]    Surgeons: Tim Castelan M.D.          Relevant Problems   GI   (positive) Gastroesophageal reflux disease      Other   (positive) Ureterolithiasis     Anes H&P:  PAST MEDICAL HISTORY:   22 y.o. female who presents for Procedure(s) (LRB):  CYSTOSCOPY, WITH URETERAL STENT INSERTION OR REMOVAL (Right)  LITHOTRIPSY, USING LASER.  She has current and past medical problems significant for:    Past Medical History:   Diagnosis Date   • ADHD (attention deficit hyperactivity disorder)    • Cellulitis    • GERD (gastroesophageal reflux disease)    • Hypogammaglobulinemia (HCC)    • Pneumonia    • RSV bronchiolitis        SMOKING/ALCOHOL/RECREATIONAL DRUG USE:  Social History     Tobacco Use   • Smoking status: Every Day     Current packs/day: 1.00     Types: Cigarettes   • Smokeless tobacco: Never   • Tobacco comments:     vape on days she doesnt smoke   Substance Use Topics   • Alcohol use: No   • Drug use: No     Types: Marijuana     Social History     Substance and Sexual Activity   Drug Use No   • Types: Marijuana       PAST SURGICAL HISTORY:  Past Surgical History:   Procedure Laterality Date   • FUNDOPLICATION BABY     • TONSILLECTOMY AND ADENOIDECTOMY      3 yr       ALLERGIES:   Allergies   Allergen Reactions   • Lamictal [Aspartame-Lamotrigine] Unspecified     Rash, itching and trouble breathing   • Sumatriptan Hives     Chest pain and hives       MEDICATIONS:  No current facility-administered medications on file prior to encounter.     Current Outpatient Medications on File Prior to Encounter   Medication Sig Dispense Refill   • clonazePAM (KLONOPIN) 0.5 MG Tab Take 0.5 mg by mouth 2 times a day as needed. Indications: Feeling  Anxious     • DULoxetine (CYMBALTA) 30 MG Cap DR Particles Take 30 mg by mouth every day.     • ARIPiprazole (ABILIFY) 5 MG tablet Take 5 mg by mouth every day.     • levoFLOXacin (LEVAQUIN) 500 MG tablet Take 250 mg by mouth every day. 3 day course         LABS:  Lab Results   Component Value Date/Time    HEMOGLOBIN 14.2 09/09/2023 1128    HEMATOCRIT 40.9 09/09/2023 1128    WBC 12.9 (H) 09/09/2023 1128     Lab Results   Component Value Date/Time    SODIUM 139 09/09/2023 1128    POTASSIUM 4.2 09/09/2023 1128    CHLORIDE 105 09/09/2023 1128    CO2 22 09/09/2023 1128    GLUCOSE 102 (H) 09/09/2023 1128    BUN 13 09/09/2023 1128    CALCIUM 9.6 09/09/2023 1128         PREVIOUS ANESTHETICS: See EMR  __________________________________________      Physical Exam    Airway   Mallampati: II  TM distance: >3 FB  Neck ROM: full       Cardiovascular - normal exam  Rhythm: regular  Rate: normal  (-) murmur     Dental - normal exam           Pulmonary - normal exam  Breath sounds clear to auscultation     Abdominal    Neurological - normal exam                 Anesthesia Plan    ASA 2- EMERGENT   ASA physical status emergent criteria: compromised vital organ, limb or tissue    Plan - general       Airway plan will be LMA          Induction: intravenous    Postoperative Plan: Postoperative administration of opioids is intended.    Pertinent diagnostic labs and testing reviewed    Informed Consent:    Anesthetic plan and risks discussed with patient.    Use of blood products discussed with: patient whom consented to blood products.

## 2023-09-09 NOTE — ASSESSMENT & PLAN NOTE
-Complicated UTI in the setting of recurrent ureterolithiasis, concerning for pyelonephritis with CVA tenderness and nausea/vomitting though with known kidney stone  - afebrile, leukocytosis of 12.9, negative lactic acid  -Urinalysis concerning for UTI  -Per chart review , previous urine culture showing Enterococcus  -IV Unasyn , closely follow urine culture for sensitivities  -Blood culture negative to date

## 2023-09-09 NOTE — ED NOTES
Med rec completed per pt   Allergies reviewed     Pt completed a 3 day course of Levaquin on 9/1/2023

## 2023-09-09 NOTE — H&P
[facility] Internal Medicine History & Physical Note    Date of Service  9/9/2023    [facility] Team: R IM White Team   Attending: Rosita Isaacs M.d.  Senior Resident: Dr. Valenzuela  Contact Number: [phone]    Primary Care Physician  Pcp Pt States None    Consultants  urology    Specialist Names: Dr. AuFlip    Code Status  Full Code    Chief Complaint  Chief Complaint   Patient presents with    Flank Pain     Pt was diagnosed with a right sided kidney stone yesterday in the ER in [place]. Pt was instructed to come here for a lithotripsy. Pt tearful and in pain. Pt denies taking any medication PTA.        History of Presenting Illness (HPI):     Adarsh Weber is a pleasant 22 y.o. female with  past medical history of ADHD, Bipoloar disorder, Anxiety, depression, Cellulitis, GERD, Hypogammaglobulinemia  History notable for history of multiple and frequent kidney stones who presents with a chief complaint of right flank pain in the setting of known kidney stone and urinary tract infection.  Of note, patient seen at outside facility/[facility] ER 3 times over the past week for right flank pain.  She had 3 separate CTs confirming a 6 x 5 x 6-1/2 mm ureteral stone at the [facility].  She was prescribed antibiotics but did not pick these up.  She was given Levaquin each time she was seen in the ER, patient states that she was unable to  Levaquin until 8/29/2023. Her urine culture grew out E faecalis. She was prescribed Flomax and Norco 10-3 25 as well.  Patient had stent placement, right 1 year ago at [place]. She was likewise previously followed by urologist there however was lost to follow-up since moving to [place].States she passes kidney stones frequently, has had multiple lithotripsy procedures in the past as above.  It is hard for her to determine when she first developed the pain in her right flank because she frequently passes stones, most recently 2 weeks ago, but yesterday it became severe and was associated with  dysuria, nausea.  Patient states that she would vomit about 3 times, of previous ingested food.  Patient describes pian to be 10/10 in intensity, more or less continuous, radiates to her right inguinal area .  Patient states that breathing makes pain become aggravated, no specific relieving factors. Does not recall having any fever or chills. at [facility] as above okay, he was diagnosed with 2 right-sided kidney stones and a urinary tract infection.  She was subsequently advised to go to  [facility] so that she could see urology.  She drove from [place] to [place] yesterday but did not feel well enough to come to the ER.     In the emergency department, patient's WBC was 12.9, creatinine normal at 0.70.  Vital signs hemodynamically stable, afebrile saturating well on room air.  Patient had results of most recent CT done which revealed 6 x 5 x 6 mm stone around the UPJ junction and another stone on the pelvocalyceal area.  No notable stone on the left.   Patient received IV hydration, IV pain medication, likewise received IV Ativan for anxiety.  Urinalysis done with leukocyte esterase and white cells but no nitrites.  hCG negative, urine culture pending, received 1 dose of IV ceftriaxone.    ERP discussed case with on-call urologist Dr. Castelan who recommended cystoscopy with right ureteral stent placement and lithotripsy.     I discussed the plan of care with patient.    Review of Systems  Review of Systems   Constitutional:  Negative for diaphoresis.   HENT: Negative.     Eyes: Negative.    Respiratory: Negative.     Cardiovascular: Negative.    Gastrointestinal:  Positive for nausea and vomiting. Negative for heartburn.   Genitourinary:  Negative for hematuria.        As per HPI   Musculoskeletal: Negative.    Neurological: Negative.    Endo/Heme/Allergies: Negative.    Psychiatric/Behavioral: Negative.         Past Medical History   has a past medical history of ADHD (attention deficit hyperactivity  disorder), Cellulitis, GERD (gastroesophageal reflux disease), Hypogammaglobulinemia (HCC), Pneumonia, and RSV bronchiolitis.    Surgical History   has a past surgical history that includes fundoplication baby and tonsillectomy and adenoidectomy.     Family History  family history includes Cancer in her maternal grandfather; Diabetes in her maternal grandfather and maternal grandmother; Heart Disease in her maternal grandmother; Hyperlipidemia in her maternal grandfather, maternal grandmother, and mother; Hypertension in her maternal grandfather and maternal grandmother; Other in her maternal grandmother and mother; Stroke in her maternal grandmother; Thyroid in her maternal aunt.   Family history reviewed with patient.     Social History  Tobacco: Smokes 1/2 pack/day for about 11 years, also uses vape  Alcohol: Occasional alcohol use  Recreational drugs (illegal or prescription): Denies IV drug use, does occasionally smoke marijuana for years to help with her anxiety  Employment: Works at fast food restaurant  Living Situation: Patient lives with ex-partner  Recent Travel: No history of recent travel  Primary Care Provider: Reviewed        Allergies  Allergies   Allergen Reactions    Lamictal [Aspartame-Lamotrigine] Unspecified     Rash, itching and trouble breathing    Sumatriptan Hives     Chest pain and hives       Medications  Prior to Admission Medications   Prescriptions Last Dose Informant Patient Reported? Taking?   ARIPiprazole (ABILIFY) 5 MG tablet 9/8/2023 at AM Patient Yes Yes   Sig: Take 5 mg by mouth every day.   DULoxetine (CYMBALTA) 30 MG Cap DR Particles 9/8/2023 at PM Patient Yes Yes   Sig: Take 30 mg by mouth every day.   clonazePAM (KLONOPIN) 0.5 MG Tab 9/8/2023 at PM Patient Yes Yes   Sig: Take 0.5 mg by mouth 2 times a day as needed. Indications: Feeling Anxious   levoFLOXacin (LEVAQUIN) 500 MG tablet 9/1/2023 at COMPLETED Patient Yes Yes   Sig: Take 250 mg by mouth every day. 3 day course       Facility-Administered Medications: None       Physical Exam  Temp:  [36 °C (96.8 °F)-36.4 °C (97.5 °F)] 36 °C (96.8 °F)  Pulse:  [] 102  Resp:  [14-20] 20  BP: (114-127)/(57-80) 127/57  SpO2:  [91 %-99 %] 93 %  Blood Pressure: 125/80   Temperature: 36.4 °C (97.5 °F)   Pulse: 72   Respiration: 16   Pulse Oximetry: 98 %       Physical Exam  Constitutional:       General: She is not in acute distress.     Appearance: Normal appearance.   HENT:      Head: Normocephalic and atraumatic.      Nose: Nose normal.      Mouth/Throat:      Mouth: Mucous membranes are dry.   Eyes:      Extraocular Movements: Extraocular movements intact.      Pupils: Pupils are equal, round, and reactive to light.   Cardiovascular:      Rate and Rhythm: Normal rate and regular rhythm.   Pulmonary:      Effort: No respiratory distress.   Abdominal:      General: There is no distension.      Palpations: There is no mass.      Tenderness: There is right CVA tenderness and guarding. There is no rebound.      Comments: Discomfort on deep palpation around the hypogastric area   Musculoskeletal:         General: No swelling or tenderness.      Cervical back: Normal range of motion.   Skin:     General: Skin is warm and dry.      Capillary Refill: Capillary refill takes less than 2 seconds.   Neurological:      General: No focal deficit present.      Mental Status: She is alert and oriented to person, place, and time.   Psychiatric:         Mood and Affect: Mood normal.         Behavior: Behavior normal.         Laboratory:  Recent Labs     09/09/23  1128   WBC 12.9*   RBC 4.32   HEMOGLOBIN 14.2   HEMATOCRIT 40.9   MCV 94.7   MCH 32.9   MCHC 34.7   RDW 43.1   PLATELETCT 313   MPV 10.9     Recent Labs     09/09/23  1128   SODIUM 139   POTASSIUM 4.2   CHLORIDE 105   CO2 22   GLUCOSE 102*   BUN 13   CREATININE 0.70   CALCIUM 9.6     Recent Labs     09/09/23  1128   ALTSGPT 14   ASTSGOT 17   ALKPHOSPHAT 59   TBILIRUBIN 0.5   LIPASE 22   GLUCOSE 102*  "        No results for input(s): \"NTPROBNP\" in the last 72 hours.      No results for input(s): \"TROPONINT\" in the last 72 hours.    Imaging:  DX-CYSTO FLUORO > 1 HOUR    (Results Pending)       X-Ray:  I have personally reviewed the images and compared with prior images.    Assessment/Plan:  Problem Representation:  Patient will need a Med/Surg bed on MEDICAL service .  The need is secondary to observation status post cystoscopy with ureteral stent insertion, lithotripsy.    * Ureterolithiasis- (present on admission)  Assessment & Plan  -Patient is scheduled for right cystoscopy with ureteral stent insertion and lithotripsy with urology  - concern for possible struvite stone given location of ureterolithiasis, however urine pH 7.5  -Appropriate hydration  -Appropriate pain management  -Urine assessment for renal calculi assessment/studies pending given history of recurrent stone  -Tamsulosin 0.4 mg daily    UTI (urinary tract infection)  Assessment & Plan  -Complicated UTI in the setting of recurrent ureterolithiasis, concerning for pyelonephritis with CVA tenderness and nausea/vomitting though with known kidney stone  - afebrile, leukocytosis of 12.9, negative lactic acid  -Urinalysis concerning for UTI  -Per chart review , previous urine culture showing Enterococcus, no record whether ESBL producing  -Ceftriaxone for now, closely follow culture for appropriate antibiotics  -Urine culture pending  -Blood culture pending    Bipolar disorder (HCC)  Assessment & Plan  -Currently stable  -resume aripiprazole 09/10/23    Anxiety  Assessment & Plan  - Resume patient's home medications 9/10/2023 including, clonazepam, duloxetine,         VTE prophylaxis: SCDs/TEDs  Padua score  0  "

## 2023-09-09 NOTE — OR SURGEON
Immediate Post OP Note    PreOp Diagnosis: right ureteral stone, right renal stone      PostOp Diagnosis: same      Procedure(s):  CYSTOSCOPY, WITH URETERAL STENT INSERTION - Wound Class: Clean Contaminated  LITHOTRIPSY, USING LASER - Wound Class: Clean Contaminated    Surgeon(s):  Tim Castelan M.D.    Anesthesiologist/Type of Anesthesia:  Anesthesiologist: Gus Ivy III, M.D.; Tim Pierre M.D./General    Surgical Staff:  Circulator: Gifty Kent R.N.  Laser Staff: Magaly Lara  Scrub Person: Antonio Gonzalez    Specimens removed if any:  * No specimens in log *    Estimated Blood Loss: none    Findings: 7mm prox ureteral stone. 6mm renal stone. 24x6 stent with strings    Complications: none        9/9/2023 3:53 PM Tim Castelan M.D.

## 2023-09-09 NOTE — ANESTHESIA PROCEDURE NOTES
Airway    Date/Time: 9/9/2023 3:13 PM    Performed by: Tim Pierre M.D.  Authorized by: Tim Pierre M.D.    Location:  OR  Urgency:  Elective  Difficult Airway: No    Indications for Airway Management:  Anesthesia      Spontaneous Ventilation: absent    Sedation Level:  Deep  Preoxygenated: Yes    Patient Position:  Sniffing  Mask Difficulty Assessment:  1 - vent by mask  Final Airway Type:  Endotracheal airway  Final Endotracheal Airway:  ETT  Cuffed: Yes    Technique Used for Successful ETT Placement:  Direct laryngoscopy    Insertion Site:  Oral  Blade Type:  Umana  Laryngoscope Blade/Videolaryngoscope Blade Size:  2  ETT Size (mm):  7.0  Measured from:  Teeth  ETT to Teeth (cm):  21  Placement Verified by: auscultation and capnometry    Cormack-Lehane Classification:  Grade I - full view of glottis  Number of Attempts at Approach:  1

## 2023-09-10 PROBLEM — R52 PAIN: Status: ACTIVE | Noted: 2023-09-10

## 2023-09-10 LAB
ALBUMIN SERPL BCP-MCNC: 4 G/DL (ref 3.2–4.9)
ALBUMIN/GLOB SERPL: 1.6 G/DL
ALP SERPL-CCNC: 45 U/L (ref 30–99)
ALT SERPL-CCNC: 12 U/L (ref 2–50)
ANION GAP SERPL CALC-SCNC: 11 MMOL/L (ref 7–16)
AST SERPL-CCNC: 18 U/L (ref 12–45)
BASOPHILS # BLD AUTO: 0.1 % (ref 0–1.8)
BASOPHILS # BLD: 0.02 K/UL (ref 0–0.12)
BILIRUB SERPL-MCNC: 0.5 MG/DL (ref 0.1–1.5)
BUN SERPL-MCNC: 9 MG/DL (ref 8–22)
CALCIUM ALBUM COR SERPL-MCNC: 9.1 MG/DL (ref 8.5–10.5)
CALCIUM SERPL-MCNC: 9.1 MG/DL (ref 8.5–10.5)
CHLORIDE SERPL-SCNC: 107 MMOL/L (ref 96–112)
CO2 SERPL-SCNC: 19 MMOL/L (ref 20–33)
CREAT SERPL-MCNC: 0.56 MG/DL (ref 0.5–1.4)
EOSINOPHIL # BLD AUTO: 0 K/UL (ref 0–0.51)
EOSINOPHIL NFR BLD: 0 % (ref 0–6.9)
ERYTHROCYTE [DISTWIDTH] IN BLOOD BY AUTOMATED COUNT: 44.3 FL (ref 35.9–50)
GFR SERPLBLD CREATININE-BSD FMLA CKD-EPI: 132 ML/MIN/1.73 M 2
GLOBULIN SER CALC-MCNC: 2.5 G/DL (ref 1.9–3.5)
GLUCOSE SERPL-MCNC: 143 MG/DL (ref 65–99)
HCT VFR BLD AUTO: 38.2 % (ref 37–47)
HGB BLD-MCNC: 12.9 G/DL (ref 12–16)
IMM GRANULOCYTES # BLD AUTO: 0.06 K/UL (ref 0–0.11)
IMM GRANULOCYTES NFR BLD AUTO: 0.4 % (ref 0–0.9)
LYMPHOCYTES # BLD AUTO: 1.13 K/UL (ref 1–4.8)
LYMPHOCYTES NFR BLD: 7.7 % (ref 22–41)
MAGNESIUM SERPL-MCNC: 1.8 MG/DL (ref 1.5–2.5)
MCH RBC QN AUTO: 32.7 PG (ref 27–33)
MCHC RBC AUTO-ENTMCNC: 33.8 G/DL (ref 32.2–35.5)
MCV RBC AUTO: 96.7 FL (ref 81.4–97.8)
MONOCYTES # BLD AUTO: 0.47 K/UL (ref 0–0.85)
MONOCYTES NFR BLD AUTO: 3.2 % (ref 0–13.4)
NEUTROPHILS # BLD AUTO: 13.06 K/UL (ref 1.82–7.42)
NEUTROPHILS NFR BLD: 88.6 % (ref 44–72)
NRBC # BLD AUTO: 0 K/UL
NRBC BLD-RTO: 0 /100 WBC (ref 0–0.2)
PLATELET # BLD AUTO: 275 K/UL (ref 164–446)
PMV BLD AUTO: 11.3 FL (ref 9–12.9)
POTASSIUM SERPL-SCNC: 4.4 MMOL/L (ref 3.6–5.5)
PROT SERPL-MCNC: 6.5 G/DL (ref 6–8.2)
RBC # BLD AUTO: 3.95 M/UL (ref 4.2–5.4)
SODIUM SERPL-SCNC: 137 MMOL/L (ref 135–145)
WBC # BLD AUTO: 14.7 K/UL (ref 4.8–10.8)

## 2023-09-10 PROCEDURE — 700102 HCHG RX REV CODE 250 W/ 637 OVERRIDE(OP): Mod: UD

## 2023-09-10 PROCEDURE — 87040 BLOOD CULTURE FOR BACTERIA: CPT | Mod: 91

## 2023-09-10 PROCEDURE — 99233 SBSQ HOSP IP/OBS HIGH 50: CPT | Mod: GC | Performed by: INTERNAL MEDICINE

## 2023-09-10 PROCEDURE — 36415 COLL VENOUS BLD VENIPUNCTURE: CPT

## 2023-09-10 PROCEDURE — 700111 HCHG RX REV CODE 636 W/ 250 OVERRIDE (IP): Mod: JZ,UD

## 2023-09-10 PROCEDURE — 96376 TX/PRO/DX INJ SAME DRUG ADON: CPT

## 2023-09-10 PROCEDURE — 85025 COMPLETE CBC W/AUTO DIFF WBC: CPT

## 2023-09-10 PROCEDURE — 770001 HCHG ROOM/CARE - MED/SURG/GYN PRIV*

## 2023-09-10 PROCEDURE — A9270 NON-COVERED ITEM OR SERVICE: HCPCS | Mod: UD

## 2023-09-10 PROCEDURE — 700105 HCHG RX REV CODE 258: Mod: UD

## 2023-09-10 PROCEDURE — 700101 HCHG RX REV CODE 250: Mod: UD

## 2023-09-10 PROCEDURE — 80053 COMPREHEN METABOLIC PANEL: CPT

## 2023-09-10 PROCEDURE — 83735 ASSAY OF MAGNESIUM: CPT

## 2023-09-10 RX ORDER — HYDROCODONE BITARTRATE AND ACETAMINOPHEN 5; 325 MG/1; MG/1
1-2 TABLET ORAL EVERY 4 HOURS PRN
Status: DISCONTINUED | OUTPATIENT
Start: 2023-09-10 | End: 2023-09-11 | Stop reason: HOSPADM

## 2023-09-10 RX ORDER — OXYBUTYNIN CHLORIDE 5 MG/1
5 TABLET ORAL 3 TIMES DAILY PRN
Status: DISCONTINUED | OUTPATIENT
Start: 2023-09-11 | End: 2023-09-11 | Stop reason: HOSPADM

## 2023-09-10 RX ORDER — MORPHINE SULFATE 4 MG/ML
4 INJECTION INTRAVENOUS
Status: DISCONTINUED | OUTPATIENT
Start: 2023-09-10 | End: 2023-09-11 | Stop reason: HOSPADM

## 2023-09-10 RX ORDER — MORPHINE SULFATE 4 MG/ML
2 INJECTION INTRAVENOUS ONCE
Status: COMPLETED | OUTPATIENT
Start: 2023-09-10 | End: 2023-09-10

## 2023-09-10 RX ADMIN — HYDROCODONE BITARTRATE AND ACETAMINOPHEN 1 TABLET: 5; 325 TABLET ORAL at 06:02

## 2023-09-10 RX ADMIN — ARIPIPRAZOLE 5 MG: 10 TABLET ORAL at 06:02

## 2023-09-10 RX ADMIN — DULOXETINE HYDROCHLORIDE 30 MG: 30 CAPSULE, DELAYED RELEASE ORAL at 17:12

## 2023-09-10 RX ADMIN — AMPICILLIN AND SULBACTAM 3 G: 1; 2 INJECTION, POWDER, FOR SOLUTION INTRAMUSCULAR; INTRAVENOUS at 12:05

## 2023-09-10 RX ADMIN — MORPHINE SULFATE 4 MG: 4 INJECTION, SOLUTION INTRAMUSCULAR; INTRAVENOUS at 14:02

## 2023-09-10 RX ADMIN — HYDROCODONE BITARTRATE AND ACETAMINOPHEN 2 TABLET: 5; 325 TABLET ORAL at 22:01

## 2023-09-10 RX ADMIN — TAMSULOSIN HYDROCHLORIDE 0.4 MG: 0.4 CAPSULE ORAL at 09:34

## 2023-09-10 RX ADMIN — SODIUM CHLORIDE, POTASSIUM CHLORIDE, SODIUM LACTATE AND CALCIUM CHLORIDE: 600; 310; 30; 20 INJECTION, SOLUTION INTRAVENOUS at 09:41

## 2023-09-10 RX ADMIN — MORPHINE SULFATE 1 MG: 4 INJECTION, SOLUTION INTRAMUSCULAR; INTRAVENOUS at 03:20

## 2023-09-10 RX ADMIN — MORPHINE SULFATE 4 MG: 4 INJECTION, SOLUTION INTRAMUSCULAR; INTRAVENOUS at 19:21

## 2023-09-10 RX ADMIN — MORPHINE SULFATE 2 MG: 4 INJECTION, SOLUTION INTRAMUSCULAR; INTRAVENOUS at 09:34

## 2023-09-10 RX ADMIN — HYDROCODONE BITARTRATE AND ACETAMINOPHEN 1 TABLET: 5; 325 TABLET ORAL at 14:02

## 2023-09-10 RX ADMIN — CLONAZEPAM 0.5 MG: 0.5 TABLET ORAL at 06:03

## 2023-09-10 RX ADMIN — MORPHINE SULFATE 1 MG: 4 INJECTION, SOLUTION INTRAMUSCULAR; INTRAVENOUS at 07:53

## 2023-09-10 RX ADMIN — AMPICILLIN AND SULBACTAM 3 G: 1; 2 INJECTION, POWDER, FOR SOLUTION INTRAMUSCULAR; INTRAVENOUS at 17:14

## 2023-09-10 RX ADMIN — CEFTRIAXONE SODIUM 1000 MG: 10 INJECTION, POWDER, FOR SOLUTION INTRAVENOUS at 06:04

## 2023-09-10 ASSESSMENT — ENCOUNTER SYMPTOMS
FEVER: 0
CHILLS: 0
HEARTBURN: 0
NEUROLOGICAL NEGATIVE: 1
FLANK PAIN: 1
PSYCHIATRIC NEGATIVE: 1
RESPIRATORY NEGATIVE: 1
EYES NEGATIVE: 1
CARDIOVASCULAR NEGATIVE: 1
MUSCULOSKELETAL NEGATIVE: 1
NAUSEA: 0
DIAPHORESIS: 0
ABDOMINAL PAIN: 1
VOMITING: 0
COUGH: 0

## 2023-09-10 ASSESSMENT — PATIENT HEALTH QUESTIONNAIRE - PHQ9
1. LITTLE INTEREST OR PLEASURE IN DOING THINGS: NOT AT ALL
2. FEELING DOWN, DEPRESSED, IRRITABLE, OR HOPELESS: NOT AT ALL
SUM OF ALL RESPONSES TO PHQ9 QUESTIONS 1 AND 2: 0
1. LITTLE INTEREST OR PLEASURE IN DOING THINGS: NOT AT ALL
SUM OF ALL RESPONSES TO PHQ9 QUESTIONS 1 AND 2: 0
2. FEELING DOWN, DEPRESSED, IRRITABLE, OR HOPELESS: NOT AT ALL

## 2023-09-10 ASSESSMENT — PAIN DESCRIPTION - PAIN TYPE
TYPE: ACUTE PAIN

## 2023-09-10 NOTE — OP REPORT
DATE OF SERVICE:  09/09/2023     NAME OF OPERATIONS:   1.  Right ureteroscopic laser lithotripsy of right proximal ureteral stone.  2.  Right ureteroscopic laser lithotripsy of right renal stone, separate   procedure.     PREOPERATIVE DIAGNOSES:   1.  Right proximal ureteral stone.  2.  Right kidney stone.     POSTOPERATIVE DIAGNOSIS:  1.  Right proximal ureteral stone.  2.  Right kidney stone.     PRIMARY SURGEON:  Tim Castelan MD     ANESTHESIOLOGIST:  Gus Ivy MD (Chip)     FINDINGS:  Complete fragmentation of 6 mm right proximal ureteral stone.    Complete fragmentation of 6-mm right kidney stone.  A 24 cm x 6-Slovak   ureteral stent with strings.     INDICATIONS:   Briefly, the patient is a 22-year-old woman who has been   dealing with renal colic for some time.  She was also diagnosed with urinary   tract infection, likely a cystitis without systemic symptoms.  She has been on   antibiotics for 3 days.  She presents with intractable colic and upon   consideration of her options, has elected to undergo surgical management.    Informed consent has been obtained.     OPERATION IN DETAIL: The patient was taken to the operating room and placed on   the operating table in a supine position.  After administration of general   anesthetic, she was placed in lithotomy.  Genitals were prepped and draped   sterilely.  A 22-Slovak cystoscope was passed in the bladder.  Bladder was   within normal limits.  Right ureteral orifice was identified and a 0.035   guidewire was passed under fluoroscopic guidance in the right kidney.     A digital flexible disposable ureteroscope passed easily over the wire to the   level of the proximal ureter.  Here, this 6-mm stone was encountered.  A 200   micron holmium laser fiber was then employed to dust along its leading edge   until the stone was completely fragmented.  A small fragment did migrate into   the kidney, which was again lasered.  Nephroscopy was performed.  All  calyces   were inspected.  There was a small stone in one of the inferior pole calyces   completely fragmented.  There was a large stone perhaps 6 mm adherent to the   tip of papilla in the mid pole position.  This was completely lasered.  At the   conclusion, there appeared to be no evidence of any residual stone.  A wire   was passed retrograde through the scope and the scope was removed.  The ureter   was patent without evidence of ureteral injury nor ureteral stone.     A 24 cm x 6-Vincentian ureteral stent was then placed in the usual manner.  Its   proximal J was seen coiling in the vicinity of the right renal pelvis and its   distal J in the bladder.  Bladder was drained and case concluded.  She   tolerated the procedure well and was taken to the recovery room in stable   condition.     Strings were left attached to the stent with the intention of being removed in   approximately 5-7 days' time.  She will be readmitted to the hospitalist   service for ongoing IV antibiotics and observation, given the recent diagnosis   of urinary tract infection.        ______________________________  Tim Castelan MD MCM/PASQUALE/ALINE    DD:  09/09/2023 15:57  DT:  09/09/2023 16:41    Job#:  [id number]

## 2023-09-10 NOTE — ASSESSMENT & PLAN NOTE
Patient on as needed oxycodone and morphine  -Increase dose of patient's dose of oxycodone and moprhine, we will also monitor patient's respiratory status,  vital signs  -At bedside, patient does not appear to be somnolent,she was awake coherent

## 2023-09-10 NOTE — HOSPITAL COURSE
22-year-old female past medical history of ADHD, bipolar disorder, depression, hypoglobulinemia, previous history of right nephrolithiases and recurrent urinary tract infection presented with right-sided flank pain.  Initially she was seen at [facility] and emergency room 3 times over past week.  CT confirmed 6 ureteral stone at the [facility].  She was sent home with levofloxacin which she did completed 8/29  for 3 days.  Urology consulted and pt will be taken later to OR for right ureteroscopic laser lithotripsy  Pt will be started on IVF   Pain management   IV unasyn   Follow up urine cultures, blood cultures

## 2023-09-10 NOTE — CARE PLAN
The patient is Stable - Low risk of patient condition declining or worsening         Progress made toward(s) clinical / shift goals:    Problem: Pain - Standard  Goal: Alleviation of pain or a reduction in pain to the patient’s comfort goal  Outcome: Progressing  Note: Pt c/o right flank pain after cystoscopy with stent placement. Prn pain meds on board and non pharmacological intervention implemented     Problem: Knowledge Deficit - Standard  Goal: Patient and family/care givers will demonstrate understanding of plan of care, disease process/condition, diagnostic tests and medications  Outcome: Progressing  Note: Updated POC re: pain management post op, reducing anxiety, IV fluids continuous per MD order  and observation post op     Problem: Early Mobilization - Post Surgery  Goal: Early mobilization post surgery  Outcome: Progressing  Note: Pt ambulates from [facility] to the hospital bed with no problem.     Problem: Pain - Post Surgery  Goal: Alleviation or reduction of pain post surgery  Outcome: Progressing  Note: Pt c/o right flank pain after cystoscopy with stent placement. Prn pain meds on board and non pharmacological intervention implemented     Problem: Respiratory  Goal: Patient will achieve/maintain optimum respiratory ventilation and gas exchange  Outcome: Progressing  Note: No s/sx of respiratory distress post op       Patient is not progressing towards the following goals:

## 2023-09-10 NOTE — OR NURSING
Pt's VSS. Pt A&Ox4. Pt has mild pain releived with rest and medication. See MAR. Pt denies nausea. Pt transferred to room S148.

## 2023-09-10 NOTE — PROGRESS NOTES
Note to reader: this note follows the APSO format rather than the historical SOAP format. Assessment and plan located at the top of the note for ease of use.    Chief Complaint  22 y.o. year old female here with Flank Pain (Pt was diagnosed with a right sided kidney stone yesterday in the ER in [place]. Pt was instructed to come here for a lithotripsy. Pt tearful and in pain. Pt denies taking any medication PTA. )    Procedures:  9/9: R CULTS with stent on strings with Dr Castelan    Assessment/Plan  Interval History   Active Hospital Problems    Diagnosis     Pain [R52]     Ureterolithiasis [N20.1]     UTI (urinary tract infection) [N39.0]     Anxiety [F41.9]     Bipolar disorder (HCC) [F31.9]      9/10 POD1. Seen and examined, lying in bed in NAD. Reports flank pain is still present but has improved. AFVSS, on unasyn, Cr WNL. Notes she has had reactions from oxycodone in the past and requests norco for pain management.    Plan:  - Patient may discharge when cleared from hospitalist perspective  - Upon discharge, recommend the following medications for stent bother:       - tamsulosin 0.4mg QD PRN       - oxybutynin 5mg IR TID PRN  - [facility] [place] will contact patient to arrange outpatient ureteral stent removal, as well as follow up regarding kidney stones  - No further urologic intervention anticipated during this admission. Urology signing off, please call with questions.         Case discussed with Dr Castelan, who has directed this patient's plan of care.      Review of Systems  Physical Exam   Review of Systems   Constitutional:  Negative for chills and fever.   Respiratory:  Negative for cough.    Cardiovascular:  Negative for chest pain.   Gastrointestinal:  Positive for abdominal pain. Negative for nausea and vomiting.   Genitourinary:  Positive for flank pain.   All other systems reviewed and are negative.    Vitals:    09/09/23 1948 09/10/23 0401 09/10/23 0414 09/10/23 0750   BP: (!) 136/96 121/78   "131/78   Pulse: 79 (!) 45 68 (!) 51   Resp: 18 16 18 17   Temp: 36.8 °C (98.3 °F) 36.1 °C (97 °F)  36.4 °C (97.6 °F)   TempSrc: Temporal Temporal  Temporal   SpO2: 99% 96%  100%   Weight: 86.8 kg (191 lb 5.8 oz)      Height: 1.727 m (5' 7.99\")        Physical Exam  Vitals reviewed.   Constitutional:       General: She is not in acute distress.  HENT:      Head: Normocephalic.      Nose: Nose normal.      Mouth/Throat:      Pharynx: Oropharynx is clear.   Eyes:      Extraocular Movements: Extraocular movements intact.   Pulmonary:      Effort: Pulmonary effort is normal.   Abdominal:      General: There is no distension.      Palpations: Abdomen is soft.   Musculoskeletal:         General: Normal range of motion.      Cervical back: Normal range of motion.   Skin:     General: Skin is warm and dry.   Neurological:      General: No focal deficit present.      Mental Status: She is alert and oriented to person, place, and time.   Psychiatric:         Mood and Affect: Mood normal.         Behavior: Behavior normal.          Hematology Chemistry   Lab Results   Component Value Date/Time    WBC 14.7 (H) 09/10/2023 03:20 AM    HEMOGLOBIN 12.9 09/10/2023 03:20 AM    HEMATOCRIT 38.2 09/10/2023 03:20 AM    PLATELETCT 275 09/10/2023 03:20 AM     Lab Results   Component Value Date/Time    SODIUM 137 09/10/2023 03:20 AM    POTASSIUM 4.4 09/10/2023 03:20 AM    CHLORIDE 107 09/10/2023 03:20 AM    CO2 19 (L) 09/10/2023 03:20 AM    GLUCOSE 143 (H) 09/10/2023 03:20 AM    BUN 9 09/10/2023 03:20 AM    CREATININE 0.56 09/10/2023 03:20 AM    CREATININE 0.5 02/25/2009 04:00 PM         Labs not explicitly included in this progress note were reviewed by the author.   Radiology/imaging not explicitly included in this progress note was reviewed by the author.     Radiology images reviewed, Labs reviewed and Medications reviewed                      "

## 2023-09-10 NOTE — PROGRESS NOTES
[facility] Internal Medicine Daily Progress Note    Date of Service  9/10/2023    R Team: [facility] IM White Team   Attending: Rosita Isaacs M.d.  Senior Resident: Dr. Valenzuela      Chief Complaint  Kidney stones, sent by [facility] for urgent urology evaluation    Hospital Course  22-year-old female past medical history of ADHD, bipolar disorder, depression, hypoglobulinemia, previous history of right nephrolithiases and recurrent urinary tract infection presented with right-sided flank pain.  Initially she was seen at [facility] and emergency room 3 times over past week.  CT confirmed 6 ureteral stone at the [facility].  She was sent home with levofloxacin which she did completed 8/29  for 3 days.  Urology consulted and pt will be taken later to OR for right ureteroscopic laser lithotripsy  Pt will be started on IVF   Pain management   IV unasyn   Follow up urine cultures, blood cultures    Interval Problem Update  Patient day 1 status post right cystoscopy with ureteral stent insertion and lithotripsy with urology ().  Adjusted patient's pain medication regimen.  Currently denies any nausea, vomiting, still with hypogastric pain and dysuria.   Episodes of bradycardia, heart rate 51, asymptomatic.  Discussed with patient interaction of pain medications and intake of marijuana as well as other medications she is taking, patient in understanding.  Discussed possible side effects from stronger pain medications, we are closely monitoring patient's vital signs and response.  Continue IV Unasyn.  WBC 12.9 from 14.7.  Patient has been afebrile, hemodynamically stable.  Blood culture negative to date, pending urine culture.     I have discussed this patient's plan of care and discharge plan at IDT rounds today with Case Management, Nursing, Nursing leadership, and other members of the IDT team.    Consultants/Specialty  Urology    Code Status  Full Code    Disposition  Not medically cleared  I have  placed the appropriate orders for post-discharge needs.    Review of Systems  Review of Systems   Constitutional:  Negative for diaphoresis.   HENT: Negative.     Eyes: Negative.    Respiratory: Negative.     Cardiovascular: Negative.    Gastrointestinal:  Positive for abdominal pain (hypogastric area). Negative for heartburn, nausea and vomiting.   Genitourinary:  Positive for dysuria. Negative for hematuria.        As per HPI   Musculoskeletal: Negative.    Neurological: Negative.    Endo/Heme/Allergies: Negative.    Psychiatric/Behavioral: Negative.          Physical Exam  Temp:  [36 °C (96.8 °F)-36.8 °C (98.3 °F)] 36.4 °C (97.6 °F)  Pulse:  [] 51  Resp:  [14-20] 17  BP: (114-150)/(57-97) 131/78  SpO2:  [91 %-100 %] 100 %    Physical Exam  Constitutional:       General: She is not in acute distress.     Appearance: Normal appearance.   HENT:      Head: Normocephalic and atraumatic.      Nose: Nose normal.      Mouth/Throat:      Mouth: Mucous membranes are dry.   Eyes:      Extraocular Movements: Extraocular movements intact.      Pupils: Pupils are equal, round, and reactive to light.   Cardiovascular:      Rate and Rhythm: Normal rate and regular rhythm.   Pulmonary:      Effort: No respiratory distress.   Abdominal:      General: There is no distension.      Palpations: There is no mass.      Tenderness: There is right CVA tenderness and guarding. There is no rebound.      Comments: Discomfort on deep palpation around the hypogastric area   Musculoskeletal:         General: No swelling or tenderness.      Cervical back: Normal range of motion.   Skin:     General: Skin is warm and dry.      Capillary Refill: Capillary refill takes less than 2 seconds.   Neurological:      General: No focal deficit present.      Mental Status: She is alert and oriented to person, place, and time.   Psychiatric:         Mood and Affect: Mood normal.         Behavior: Behavior normal.         Fluids    Intake/Output Summary  (Last 24 hours) at 9/10/2023 1251  Last data filed at 9/9/2023 2210  Gross per 24 hour   Intake 1400 ml   Output 0 ml   Net 1400 ml       Laboratory  Recent Labs     09/09/23  1128 09/10/23  0320   WBC 12.9* 14.7*   RBC 4.32 3.95*   HEMOGLOBIN 14.2 12.9   HEMATOCRIT 40.9 38.2   MCV 94.7 96.7   MCH 32.9 32.7   MCHC 34.7 33.8   RDW 43.1 44.3   PLATELETCT 313 275   MPV 10.9 11.3     Recent Labs     09/09/23  1128 09/10/23  0320   SODIUM 139 137   POTASSIUM 4.2 4.4   CHLORIDE 105 107   CO2 22 19*   GLUCOSE 102* 143*   BUN 13 9   CREATININE 0.70 0.56   CALCIUM 9.6 9.1                   Imaging  DX-CYSTO FLUORO > 1 HOUR   Final Result      Cysto fluoroscopy utilized for 5 seconds by the urologist         INTERPRETING LOCATION: [address] [place] [address], [facility]           Assessment/Plan  Problem Representation:    * Ureterolithiasis- (present on admission)  Assessment & Plan  -S/P right cystoscopy with ureteral stent insertion and lithotripsy with urology 09/09/23  - concern for possible struvite stone given location of ureterolithiasis, however urine pH 7.5  -Appropriate hydration  -Appropriate pain management as below   -Urine assessment for renal calculi assessment/studies pending given history of recurrent stone  -Tamsulosin 0.4 mg daily    UTI (urinary tract infection)  Assessment & Plan  -Complicated UTI in the setting of recurrent ureterolithiasis, concerning for pyelonephritis with CVA tenderness and nausea/vomitting though with known kidney stone  - afebrile, leukocytosis of 12.9, negative lactic acid  -Urinalysis concerning for UTI  -Per chart review , previous urine culture showing Enterococcus  -IV Unasyn , closely follow urine culture for sensitivities  -Blood culture negative to date    Pain  Assessment & Plan  Patient on as needed oxycodone and morphine  -Increase dose of patient's dose of oxycodone and moprhine, we will also monitor patient's respiratory status,  vital signs  -At bedside, patient does not appear  to be somnolent,she was awake coherent       Bipolar disorder (HCC)  Assessment & Plan  -Currently stable  -resumed aripiprazole 09/10/23    Anxiety  Assessment & Plan  - Resumed patient's home medications 9/10/2023 including, clonazepam, duloxetine,          VTE prophylaxis: SCDs/TEDs  Padua score 0    I have performed a physical exam and reviewed and updated ROS and Plan today (9/10/2023). In review of yesterday's note (9/9/2023), there are no changes except as documented above.

## 2023-09-10 NOTE — ANESTHESIA POSTPROCEDURE EVALUATION
Patient: Adarsh Weber    Procedure Summary     Date: 09/09/23 Room / Location: [place] [address] / SURGERY [facility]    Anesthesia Start: 1508 Anesthesia Stop: 1608    Procedures:       CYSTOSCOPY, WITH URETERAL STENT INSERTION (Right: Ureter)      LITHOTRIPSY, USING LASER (Right: Ureter) Diagnosis: (Right Ureteral Stone, Right Kidney Stone)    Surgeons: Tim Castelan M.D. Responsible Provider: Gus Ivy III, M.D.    Anesthesia Type: general ASA Status: 2 - Emergent          Final Anesthesia Type: general  Last vitals  BP   Blood Pressure: (!) 136/96    Temp   36.8 °C (98.3 °F)    Pulse   79   Resp   18    SpO2   99 %      Anesthesia Post Evaluation    Patient location during evaluation: PACU  Patient participation: complete - patient participated  Level of consciousness: awake and alert  Pain score: 6    Airway patency: patent  Anesthetic complications: no  Cardiovascular status: hemodynamically stable  Respiratory status: acceptable  Hydration status: euvolemic    PONV: none          No notable events documented.     Nurse Pain Score: 6 (NPRS)

## 2023-09-10 NOTE — ANESTHESIA TIME REPORT
Anesthesia Start and Stop Event Times     Date Time Event    9/9/2023 1508 Ready for Procedure     1508 Anesthesia Start     1608 Anesthesia Stop        Responsible Staff  09/09/23    Name Role Begin End    Tim Pierre M.D. Anesth 1508 1519    Gus Ivy III, M.D. Anesth 1519 1608        Overtime Reason:  no overtime (within assigned shift)    Comments:

## 2023-09-10 NOTE — PROGRESS NOTES
This RN placed pt's home meds , cigarette and lighters in a security bag and sealed with pt's permission. Pt put back her personal stuff in her purse. POC ongoing.

## 2023-09-10 NOTE — PROGRESS NOTES
@ 1948- Received pt PACU, check pt's personal belongings with home meds of Duloxetine HCL EC 30 mg cap, Klonazepam 0.5 mg tab, levofloxacin 500 mg tab, marijuana ( in a bottle) 3 lighters, pack of cigarettes, vape. This RN offered pt's meds place in safe keeping. Pt started to become anxious and crying stated she's not going to put her stuff in safe keeping because it might get stolen. Informed Charge NIC Sanches and Supervisor Art. Inform the security about the cigarettes and lighters and per security we can not take her stuff from her purse.    @ 2055: Informed Dr Germaine Valenzuela re: pt's home meds and lighters and cigarettes that was placed in a non hazard plastic bag. Pt states she will not smoke while she's in the hospital.Per the provider, if it's the hospital policy, then she can allow it.    POC ongoing

## 2023-09-10 NOTE — CONSULTS
DATE OF SERVICE:  09/09/2023     UROLOGY CONSULT NOTE     Urology service was counseled by Dr. Feliz of the emergency department for   advice and opinion regarding this woman's right-sided kidney stone.     HISTORY OF PRESENT ILLNESS:  The patient states she has had right-sided pain   ongoing for approximately a month.  She has a history of prior kidney stones,   treated with lithotripsy in the past.  She has presented the last 3 days to   [facility], at which point she has been given a dose of oral   Levaquin.  She states she has had 3 doses of this oral Levaquin on 3   consecutive days, last dose yesterday.  Apparently, there were two right-sided   kidney stones.  She has not had any fevers.     CT scan demonstrates a 7 mm stone at the right ureteropelvic junction.  I do   not have access to these images.  For detailed account of the patient's past   medical, surgical, social history and review of systems, please see Dr. Feliz's history and physical dated today in the patient's chart.     PHYSICAL EXAMINATION:  GENERAL:  Focused physical examination reveals woman, somewhat tearful,   somewhat emotional in hospital [facility].  VITAL SIGNS:  Temperature 36.4, pulse 72, blood pressure 125/80.  ABDOMEN:  Nontender, nondistended.     LABORATORY DATA:  White blood cell count 12.9, neutrophils 68%.  Creatinine is   0.7.  Urinalysis notable for 10-20 white cells, few epithelial cells, and no   bacteria.     IMPRESSION AND PLAN:  A 22-year-old woman with at least a 7-mm right   ureteropelvic junction stone.  There is possibly another stone in this kidney.    She had an Enterococcus culture from several days ago that is sensitive to   Levaquin.  She has been on Levaquin for 3 days, last dose yesterday.  She is   not showing any signs of toxicity.  She very much wishes to have the stone   treated.  I explained options can include ureteroscopic laser lithotripsy with   placement of the stent.  If there is  evidence of obvious infection, a stent   would be left in place only with her plan to return in the near future for   definitive treatment after further antibiotic therapy.  I explained the   procedure, how it is performed including its risks associated, which can   include but not be limited to bleeding, worsening of infection, incomplete   treatment of stones, inability to access stones, use of a stent, stent colic   and absolute need for followup for stent removal.  The patient expresses   adequate understanding and wished to proceed.        ______________________________  Tim Castelan MD MCM/XIOMARA    DD:  09/09/2023 15:01  DT:  09/09/2023 17:51    Job#:  [id number]

## 2023-09-10 NOTE — PROGRESS NOTES
4 Eyes Skin Assessment Completed by NIC King and NIC Medrano.    Head WDL  Ears WDL  Nose WDL  Mouth WDL  Neck WDL  Breast/Chest WDL  Shoulder Blades WDL  Spine Redness - mosquito bites per pt report  (R) Arm/Elbow/Hand Redness - mosquito bites per pt report  (L) Arm/Elbow/Hand Redness - mosquito bites per pt report  Abdomen WDL  Groin WDL  Scrotum/Coccyx/Buttocks WDL  (R) Leg Redness- mosquito bites per pt report  (L) Leg Redness - mosquito bites per pt report  (R) Heel/Foot/Toe Redness and Blanching  (L) Heel/Foot/Toe Redness and Blanching          Devices In Places Blood Pressure Cuff      Interventions In Place Pillows    Possible Skin Injury No    Pictures Uploaded Into Epic N/A  Wound Consult Placed N/A  RN Wound Prevention Protocol Ordered No

## 2023-09-11 ENCOUNTER — PATIENT OUTREACH (OUTPATIENT)
Dept: SCHEDULING | Facility: IMAGING CENTER | Age: 23
End: 2023-09-11

## 2023-09-11 ENCOUNTER — PHARMACY VISIT (OUTPATIENT)
Dept: PHARMACY | Facility: MEDICAL CENTER | Age: 23
End: 2023-09-11
Payer: MEDICARE

## 2023-09-11 VITALS
RESPIRATION RATE: 16 BRPM | HEIGHT: 68 IN | DIASTOLIC BLOOD PRESSURE: 82 MMHG | WEIGHT: 191.36 LBS | SYSTOLIC BLOOD PRESSURE: 121 MMHG | TEMPERATURE: 96.8 F | BODY MASS INDEX: 29 KG/M2 | OXYGEN SATURATION: 99 % | HEART RATE: 58 BPM

## 2023-09-11 PROBLEM — N20.0 NEPHROLITHIASIS: Status: ACTIVE | Noted: 2023-09-11

## 2023-09-11 LAB
ANION GAP SERPL CALC-SCNC: 8 MMOL/L (ref 7–16)
BACTERIA UR CULT: NORMAL
BASOPHILS # BLD AUTO: 0.4 % (ref 0–1.8)
BASOPHILS # BLD: 0.04 K/UL (ref 0–0.12)
BUN SERPL-MCNC: 9 MG/DL (ref 8–22)
CALCIUM SERPL-MCNC: 8.7 MG/DL (ref 8.5–10.5)
CHLORIDE SERPL-SCNC: 105 MMOL/L (ref 96–112)
CO2 SERPL-SCNC: 25 MMOL/L (ref 20–33)
CREAT SERPL-MCNC: 0.56 MG/DL (ref 0.5–1.4)
EOSINOPHIL # BLD AUTO: 0.12 K/UL (ref 0–0.51)
EOSINOPHIL NFR BLD: 1.2 % (ref 0–6.9)
ERYTHROCYTE [DISTWIDTH] IN BLOOD BY AUTOMATED COUNT: 44.6 FL (ref 35.9–50)
GFR SERPLBLD CREATININE-BSD FMLA CKD-EPI: 132 ML/MIN/1.73 M 2
GLUCOSE SERPL-MCNC: 98 MG/DL (ref 65–99)
HCT VFR BLD AUTO: 33.8 % (ref 37–47)
HGB BLD-MCNC: 11.4 G/DL (ref 12–16)
IMM GRANULOCYTES # BLD AUTO: 0.03 K/UL (ref 0–0.11)
IMM GRANULOCYTES NFR BLD AUTO: 0.3 % (ref 0–0.9)
LYMPHOCYTES # BLD AUTO: 3.34 K/UL (ref 1–4.8)
LYMPHOCYTES NFR BLD: 32.7 % (ref 22–41)
MCH RBC QN AUTO: 32.4 PG (ref 27–33)
MCHC RBC AUTO-ENTMCNC: 33.7 G/DL (ref 32.2–35.5)
MCV RBC AUTO: 96 FL (ref 81.4–97.8)
MONOCYTES # BLD AUTO: 0.72 K/UL (ref 0–0.85)
MONOCYTES NFR BLD AUTO: 7.1 % (ref 0–13.4)
NEUTROPHILS # BLD AUTO: 5.96 K/UL (ref 1.82–7.42)
NEUTROPHILS NFR BLD: 58.3 % (ref 44–72)
NRBC # BLD AUTO: 0 K/UL
NRBC BLD-RTO: 0 /100 WBC (ref 0–0.2)
PLATELET # BLD AUTO: 228 K/UL (ref 164–446)
PMV BLD AUTO: 11.5 FL (ref 9–12.9)
POTASSIUM SERPL-SCNC: 3.6 MMOL/L (ref 3.6–5.5)
RBC # BLD AUTO: 3.52 M/UL (ref 4.2–5.4)
SIGNIFICANT IND 70042: NORMAL
SITE SITE: NORMAL
SODIUM SERPL-SCNC: 138 MMOL/L (ref 135–145)
SOURCE SOURCE: NORMAL
WBC # BLD AUTO: 10.2 K/UL (ref 4.8–10.8)

## 2023-09-11 PROCEDURE — RXMED WILLOW AMBULATORY MEDICATION CHARGE: Performed by: STUDENT IN AN ORGANIZED HEALTH CARE EDUCATION/TRAINING PROGRAM

## 2023-09-11 PROCEDURE — 700102 HCHG RX REV CODE 250 W/ 637 OVERRIDE(OP)

## 2023-09-11 PROCEDURE — 99239 HOSP IP/OBS DSCHRG MGMT >30: CPT | Mod: GC | Performed by: INTERNAL MEDICINE

## 2023-09-11 PROCEDURE — 700105 HCHG RX REV CODE 258

## 2023-09-11 PROCEDURE — 96365 THER/PROPH/DIAG IV INF INIT: CPT

## 2023-09-11 PROCEDURE — 96366 THER/PROPH/DIAG IV INF ADDON: CPT

## 2023-09-11 PROCEDURE — 700111 HCHG RX REV CODE 636 W/ 250 OVERRIDE (IP): Mod: JZ

## 2023-09-11 PROCEDURE — 80048 BASIC METABOLIC PNL TOTAL CA: CPT

## 2023-09-11 PROCEDURE — 700101 HCHG RX REV CODE 250: Performed by: INTERNAL MEDICINE

## 2023-09-11 PROCEDURE — A9270 NON-COVERED ITEM OR SERVICE: HCPCS

## 2023-09-11 PROCEDURE — 85025 COMPLETE CBC W/AUTO DIFF WBC: CPT

## 2023-09-11 RX ORDER — AMOXICILLIN AND CLAVULANATE POTASSIUM 875; 125 MG/1; MG/1
1 TABLET, FILM COATED ORAL 2 TIMES DAILY
Qty: 14 TABLET | Refills: 0 | Status: ACTIVE | OUTPATIENT
Start: 2023-09-11 | End: 2023-09-18

## 2023-09-11 RX ORDER — HYDROCODONE BITARTRATE AND ACETAMINOPHEN 5; 325 MG/1; MG/1
1-2 TABLET ORAL EVERY 4 HOURS PRN
Qty: 12 TABLET | Refills: 0 | Status: CANCELLED | OUTPATIENT
Start: 2023-09-11 | End: 2023-09-14

## 2023-09-11 RX ORDER — OXYBUTYNIN CHLORIDE 5 MG/1
5 TABLET ORAL 3 TIMES DAILY PRN
Qty: 30 TABLET | Refills: 0 | Status: SHIPPED | OUTPATIENT
Start: 2023-09-11 | End: 2023-09-25

## 2023-09-11 RX ORDER — DIPHENHYDRAMINE HCL 12.5MG/5ML
12.5 LIQUID (ML) ORAL
Status: COMPLETED | OUTPATIENT
Start: 2023-09-11 | End: 2023-09-11

## 2023-09-11 RX ORDER — TAMSULOSIN HYDROCHLORIDE 0.4 MG/1
0.4 CAPSULE ORAL
Qty: 30 CAPSULE | Refills: 0 | Status: SHIPPED | OUTPATIENT
Start: 2023-09-12 | End: 2023-10-12

## 2023-09-11 RX ORDER — POLYETHYLENE GLYCOL 3350 17 G/17G
1 POWDER, FOR SOLUTION ORAL ONCE
Status: DISCONTINUED | OUTPATIENT
Start: 2023-09-11 | End: 2023-09-11 | Stop reason: HOSPADM

## 2023-09-11 RX ORDER — OXYCODONE AND ACETAMINOPHEN 10; 325 MG/1; MG/1
1 TABLET ORAL EVERY 4 HOURS PRN
Qty: 12 TABLET | Refills: 0 | Status: SHIPPED | OUTPATIENT
Start: 2023-09-11 | End: 2023-09-14

## 2023-09-11 RX ADMIN — MORPHINE SULFATE 4 MG: 4 INJECTION, SOLUTION INTRAMUSCULAR; INTRAVENOUS at 12:17

## 2023-09-11 RX ADMIN — MORPHINE SULFATE 4 MG: 4 INJECTION, SOLUTION INTRAMUSCULAR; INTRAVENOUS at 05:44

## 2023-09-11 RX ADMIN — AMPICILLIN AND SULBACTAM 3 G: 1; 2 INJECTION, POWDER, FOR SOLUTION INTRAMUSCULAR; INTRAVENOUS at 00:04

## 2023-09-11 RX ADMIN — HYDROCODONE BITARTRATE AND ACETAMINOPHEN 2 TABLET: 5; 325 TABLET ORAL at 03:08

## 2023-09-11 RX ADMIN — MORPHINE SULFATE 4 MG: 4 INJECTION, SOLUTION INTRAMUSCULAR; INTRAVENOUS at 01:30

## 2023-09-11 RX ADMIN — SENNOSIDES AND DOCUSATE SODIUM 2 TABLET: 50; 8.6 TABLET ORAL at 05:44

## 2023-09-11 RX ADMIN — HYDROCODONE BITARTRATE AND ACETAMINOPHEN 2 TABLET: 5; 325 TABLET ORAL at 10:11

## 2023-09-11 RX ADMIN — ARIPIPRAZOLE 5 MG: 10 TABLET ORAL at 05:44

## 2023-09-11 RX ADMIN — SODIUM CHLORIDE, POTASSIUM CHLORIDE, SODIUM LACTATE AND CALCIUM CHLORIDE: 600; 310; 30; 20 INJECTION, SOLUTION INTRAVENOUS at 00:40

## 2023-09-11 RX ADMIN — TAMSULOSIN HYDROCHLORIDE 0.4 MG: 0.4 CAPSULE ORAL at 08:28

## 2023-09-11 RX ADMIN — AMPICILLIN AND SULBACTAM 3 G: 1; 2 INJECTION, POWDER, FOR SOLUTION INTRAMUSCULAR; INTRAVENOUS at 05:49

## 2023-09-11 RX ADMIN — AMPICILLIN AND SULBACTAM 3 G: 1; 2 INJECTION, POWDER, FOR SOLUTION INTRAMUSCULAR; INTRAVENOUS at 12:21

## 2023-09-11 RX ADMIN — CLONAZEPAM 0.5 MG: 0.5 TABLET ORAL at 08:35

## 2023-09-11 RX ADMIN — DIPHENHYDRAMINE HYDROCHLORIDE 12.5 MG: 12.5 SOLUTION ORAL at 03:38

## 2023-09-11 ASSESSMENT — PAIN DESCRIPTION - PAIN TYPE
TYPE: ACUTE PAIN

## 2023-09-11 ASSESSMENT — PATIENT HEALTH QUESTIONNAIRE - PHQ9
SUM OF ALL RESPONSES TO PHQ9 QUESTIONS 1 AND 2: 0
2. FEELING DOWN, DEPRESSED, IRRITABLE, OR HOPELESS: NOT AT ALL
1. LITTLE INTEREST OR PLEASURE IN DOING THINGS: NOT AT ALL

## 2023-09-11 NOTE — DISCHARGE SUMMARY
[facility] Internal Medicine Discharge Summary    Attending: Rosita Isaacs M.d.  Senior Resident: Dr. Naranjo  Contact Number: [phone]    CHIEF COMPLAINT ON ADMISSION  Chief Complaint   Patient presents with    Flank Pain     Pt was diagnosed with a right sided kidney stone yesterday in the ER in [place]. Pt was instructed to come here for a lithotripsy. Pt tearful and in pain. Pt denies taking any medication PTA.        Reason for Admission  Flank pain     Admission Date  9/9/2023    CODE STATUS  Full Code    HPI & HOSPITAL COURSE    Patient is a 22-year-old female with past med history of ADHD, bipolar disorder, anxiety, hyperlipidemia, recurrent nephrolithiasis s/p lithotripsy and right ureteral stent placement in 2022 in [place], recurrent UTI, who presented to the ED on 9/9/2023 for management of right ureterolithiasis and Enterococcus urinary tract infection.  She went to the ED in [place] 3 times last week, 3 separate CTs confirming 6 x 5 x 6-1/2 mm ureteral stone at the [facility], urine culture positive for Enterococcus, prescribed antibiotics 3x that she was unable to , and drove from [place] to [facility] ED for urology consult.    ED work-up included VSS on presentation, not septic, WBC 12.9, UA with resolving UTI, urine cultures and blood culture negative.  She had a R Cystoscopy, Ureteroscopic Laser Lithotripsy, with Stent (CULTS) on 9/9/2023, started on tamsulosin and oxybutynin as needed for spasms.    She will be discharged on an additional week of Augmentin and to follow-up with urology for right ureteral stent removal.  She was discharged on 3-day course of Percocet for pain control.    Therefore, she is discharged in good and stable condition to home with close outpatient follow-up.    The patient recovered much more quickly than anticipated on admission.    Discharge Date  9/11/23    Physical Exam on Day of Discharge  Physical Exam  Vitals and nursing note reviewed.   Constitutional:       Appearance:  Normal appearance.   HENT:      Head: Normocephalic and atraumatic.      Right Ear: External ear normal.      Left Ear: External ear normal.      Nose: Nose normal.      Mouth/Throat:      Mouth: Mucous membranes are moist.      Pharynx: Oropharynx is clear.   Eyes:      Extraocular Movements: Extraocular movements intact.      Pupils: Pupils are equal, round, and reactive to light.   Cardiovascular:      Rate and Rhythm: Normal rate and regular rhythm.      Pulses: Normal pulses.      Heart sounds: Normal heart sounds. No murmur heard.     No friction rub. No gallop.   Pulmonary:      Effort: Pulmonary effort is normal. No respiratory distress.      Breath sounds: Normal breath sounds. No stridor. No wheezing, rhonchi or rales.   Abdominal:      General: There is no distension.      Palpations: Abdomen is soft.      Tenderness: There is no abdominal tenderness. There is no guarding or rebound.   Musculoskeletal:         General: Normal range of motion.      Cervical back: Normal range of motion.   Skin:     General: Skin is warm.      Capillary Refill: Capillary refill takes less than 2 seconds.      Coloration: Skin is not jaundiced.   Neurological:      General: No focal deficit present.      Mental Status: She is alert and oriented to person, place, and time. Mental status is at baseline.   Psychiatric:         Mood and Affect: Mood normal.         Behavior: Behavior normal.         Thought Content: Thought content normal.         Judgment: Judgment normal.         FOLLOW UP ITEMS POST DISCHARGE  As Above    DISCHARGE DIAGNOSES  Principal Problem:    Ureterolithiasis (POA: Yes)  Active Problems:    UTI (urinary tract infection) (POA: Unknown)    Anxiety (POA: Unknown)    Bipolar disorder (HCC) (POA: Unknown)    Pain (POA: Unknown)    Nephrolithiasis (POA: Yes)  Resolved Problems:    * No resolved hospital problems. *      FOLLOW UP  No future appointments.  Tim Castelan M.D.  [address]  [place]  [address]  [phone]    Follow up  [facility] [place] will contact patient to arrange outpatient follow up.      MEDICATIONS ON DISCHARGE     Medication List        START taking these medications        Instructions   amoxicillin-clavulanate 875-125 MG Tabs  Commonly known as: Augmentin   Take 1 Tablet by mouth 2 times a day for 7 days.  Dose: 1 Tablet     oxybutynin 5 MG Tabs  Commonly known as: Ditropan   Doctor's comments: USE FOR URINARY URGENCY OR SPASM  Take 1 Tablet by mouth 3 times a day as needed (urinary urgency) for up to 14 days.  Dose: 5 mg     oxyCODONE-acetaminophen  MG Tabs  Commonly known as: Percocet-10   Take 1 Tablet by mouth every four hours as needed for Severe Pain for up to 3 days.  Dose: 1 Tablet     tamsulosin 0.4 MG capsule  Start taking on: September 12, 2023  Commonly known as: Flomax   Take 1 Capsule by mouth 1/2 hour after breakfast for 30 days.  Dose: 0.4 mg            CONTINUE taking these medications        Instructions   Abilify 5 MG tablet  Generic drug: ARIPiprazole   Take 5 mg by mouth every day.  Dose: 5 mg     clonazePAM 0.5 MG Tabs  Commonly known as: KlonoPIN   Take 0.5 mg by mouth 2 times a day as needed. Indications: Feeling Anxious  Dose: 0.5 mg     DULoxetine 30 MG Cpep  Commonly known as: Cymbalta   Take 30 mg by mouth every day.  Dose: 30 mg            STOP taking these medications      levoFLOXacin 500 MG tablet  Commonly known as: Levaquin              Allergies  Allergies   Allergen Reactions    Lamictal [Aspartame-Lamotrigine] Unspecified     Rash, itching and trouble breathing    Sumatriptan Hives     Chest pain and hives       DIET  Orders Placed This Encounter   Procedures    Diet Order Diet: Regular     Standing Status:   Standing     Number of Occurrences:   1     Order Specific Question:   Diet:     Answer:   Regular [1]       ACTIVITY  As tolerated.  Weight bearing as tolerated    CONSULTATIONS  Surgery urology    PROCEDURES    NAME OF OPERATIONS  (9/9/23):  1.  Right ureteroscopic laser lithotripsy of right proximal ureteral stone.  2.  Right ureteroscopic laser lithotripsy of right renal stone, separate   procedure.     PREOPERATIVE DIAGNOSES:   1.  Right proximal ureteral stone.  2.  Right kidney stone.     POSTOPERATIVE DIAGNOSIS:  1.  Right proximal ureteral stone.  2.  Right kidney stone.    LABORATORY  Lab Results   Component Value Date    SODIUM 138 09/11/2023    POTASSIUM 3.6 09/11/2023    CHLORIDE 105 09/11/2023    CO2 25 09/11/2023    GLUCOSE 98 09/11/2023    BUN 9 09/11/2023    CREATININE 0.56 09/11/2023    CREATININE 0.5 02/25/2009        Lab Results   Component Value Date    WBC 10.2 09/11/2023    HEMOGLOBIN 11.4 (L) 09/11/2023    HEMATOCRIT 33.8 (L) 09/11/2023    PLATELETCT 228 09/11/2023        Total time of the discharge process exceeds 31 minutes.

## 2023-09-11 NOTE — PROGRESS NOTES
Patient explained discharge lounge discharge process, patient denied any questions. Patients belongings present on admission were present on discharge. Patient was toileted and dressed prior to transport arriving. Patient denies any further questions, patient transported to discharge lounge via transport.

## 2023-09-11 NOTE — CARE PLAN
The patient is Stable - Low risk of patient condition declining or worsening    Shift Goals  Clinical Goals: Pain management, Abx therapy, IVF, labs monitoring  Patient Goals: pain relief, anxiety management and go home  Family Goals: not present    Progress made toward(s) clinical / shift goals:    Problem: Pain - Standard  Goal: Alleviation of pain or a reduction in pain to the patient’s comfort goal  Outcome: Progressing  Note: Pt responds to pharmacological and non pharmacological intervention for pain     Problem: Knowledge Deficit - Standard  Goal: Patient and family/care givers will demonstrate understanding of plan of care, disease process/condition, diagnostic tests and medications  Outcome: Progressing  Note: Updated POC re: Abx therapy, pain management, lab monitoring and IVF. Pt verbalized understanding to the treatment plan, POC ongoing.     Problem: Respiratory  Goal: Patient will achieve/maintain optimum respiratory ventilation and gas exchange  Outcome: Progressing  Note: No s/sx of distress on RA       Patient is not progressing towards the following goals:

## 2023-09-11 NOTE — CARE PLAN
The patient is Stable - Low risk of patient condition declining or worsening    Shift Goals  Clinical Goals: Pain management, monitor labs, IV fluids, possible DC  Patient Goals: Pain control, anxiety management, go home  Family Goals: KIMBERLI    Progress made toward(s) clinical / shift goals:    Problem: Pain - Standard  Goal: Alleviation of pain or a reduction in pain to the patient’s comfort goal  Outcome: Progressing     Problem: Knowledge Deficit - Standard  Goal: Patient and family/care givers will demonstrate understanding of plan of care, disease process/condition, diagnostic tests and medications  Outcome: Progressing     Problem: Pain - Post Surgery  Goal: Alleviation or reduction of pain post surgery  Outcome: Progressing     Problem: Respiratory  Goal: Patient will achieve/maintain optimum respiratory ventilation and gas exchange  Outcome: Progressing       Patient is not progressing towards the following goals:

## 2023-09-15 LAB
BACTERIA BLD CULT: NORMAL
BACTERIA BLD CULT: NORMAL
SIGNIFICANT IND 70042: NORMAL
SIGNIFICANT IND 70042: NORMAL
SITE SITE: NORMAL
SITE SITE: NORMAL
SOURCE SOURCE: NORMAL
SOURCE SOURCE: NORMAL

## (undated) DEVICE — SPONGE GAUZESTER 4 X 4 4PLY - (128PK/CA)

## (undated) DEVICE — SET EXTENSION WITH 2 PORTS (48EA/CA) ***PART #2C8610 IS A SUBSTITUTE*****

## (undated) DEVICE — SCOPE DIGITAL URETEROSCOPE DISPOSABLE (10EA/PK)

## (undated) DEVICE — WATER IRRIGATION STERILE 1000ML (12EA/CA)

## (undated) DEVICE — COVER LIGHT HANDLE ALC PLUS DISP (18EA/BX)

## (undated) DEVICE — TUBING CLEARLINK DUO-VENT - C-FLO (48EA/CA)

## (undated) DEVICE — BAG URODRAIN WITH TUBING - (20/CA)

## (undated) DEVICE — SHEATH NAVIGATOR 11/13 X 36 URETERAL ACCESS (5EA/BX)

## (undated) DEVICE — PACK CYSTO III (2EA/CA)

## (undated) DEVICE — TOWEL STOP TIMEOUT SAFETY FLAG (40EA/CA)

## (undated) DEVICE — SENSOR OXIMETER ADULT SPO2 RD SET (20EA/BX)

## (undated) DEVICE — CANISTER SUCTION 3000ML MECHANICAL FILTER AUTO SHUTOFF MEDI-VAC NONSTERILE LF DISP  (40EA/CA)

## (undated) DEVICE — GOWN WARMING STANDARD FLEX - (30/CA)

## (undated) DEVICE — CONNECTOR HOSE NEPTUNE FOR CYSTO ROOM

## (undated) DEVICE — SODIUM CHL. IRRIGATION 0.9% 3000ML (4EA/CA 65CA/PF)

## (undated) DEVICE — LACTATED RINGERS INJ 1000 ML - (14EA/CA 60CA/PF)

## (undated) DEVICE — GOWN SURGICAL X-LARGE ULTRA - FILM-REINFORCED (20/CA)

## (undated) DEVICE — CONTAINER SPECIMEN BAG OR - STERILE 4 OZ W/LID (100EA/CA)

## (undated) DEVICE — GLOVE BIOGEL SZ 7.5 SURGICAL PF LTX - (50PR/BX 4BX/CA)

## (undated) DEVICE — PACK CYSTOSCOPY III - (8/CA)

## (undated) DEVICE — SET LEADWIRE 5 LEAD BEDSIDE DISPOSABLE ECG (1SET OF 5/EA)

## (undated) DEVICE — SUCTION INSTRUMENT YANKAUER BULBOUS TIP W/O VENT (50EA/CA)

## (undated) DEVICE — WATER IRRIG. STER 3000 ML - (4/CA)

## (undated) DEVICE — SET IRRIGATION CYSTOSCOPY Y-TYPE L81 IN (20EA/CA)

## (undated) DEVICE — FIBER LASER MOSES 200 UM (1/EA)

## (undated) DEVICE — WIRE GUIDE SENSOR DUAL FLEX - 5/BX

## (undated) DEVICE — COVER FOOT UNIVERSAL DISP. - (25EA/CA)

## (undated) DEVICE — SLEEVE, VASO, THIGH, MED

## (undated) DEVICE — GOWN SURGEONS X-LARGE - DISP. (30/CA)

## (undated) DEVICE — JELLY SURGILUBE STERILE TUBE 4.25 OZ (1/EA)